# Patient Record
Sex: FEMALE | Race: WHITE | NOT HISPANIC OR LATINO | Employment: OTHER | URBAN - METROPOLITAN AREA
[De-identification: names, ages, dates, MRNs, and addresses within clinical notes are randomized per-mention and may not be internally consistent; named-entity substitution may affect disease eponyms.]

---

## 2023-05-22 LAB
ALBUMIN (G/DL) IN SER/PLAS: 4.1 G/DL (ref 3.4–5)
ALBUMIN (MG/L) IN URINE: <7 MG/L
ALBUMIN/CREATININE (UG/MG) IN URINE: ABNORMAL UG/MG CRT (ref 0–30)
ANION GAP IN SER/PLAS: 12 MMOL/L (ref 10–20)
APPEARANCE, URINE: ABNORMAL
BACTERIA, URINE: ABNORMAL /HPF
BILIRUBIN, URINE: NEGATIVE
BLOOD, URINE: NEGATIVE
CALCIDIOL (25 OH VITAMIN D3) (NG/ML) IN SER/PLAS: 115 NG/ML
CALCIUM (MG/DL) IN SER/PLAS: 9.3 MG/DL (ref 8.6–10.3)
CARBON DIOXIDE, TOTAL (MMOL/L) IN SER/PLAS: 29 MMOL/L (ref 21–32)
CHLORIDE (MMOL/L) IN SER/PLAS: 99 MMOL/L (ref 98–107)
COLOR, URINE: YELLOW
CREATININE (MG/DL) IN SER/PLAS: 1.62 MG/DL (ref 0.5–1.05)
CREATININE (MG/DL) IN URINE: 19.9 MG/DL (ref 20–320)
ERYTHROCYTE DISTRIBUTION WIDTH (RATIO) BY AUTOMATED COUNT: 16.3 % (ref 11.5–14.5)
ERYTHROCYTE MEAN CORPUSCULAR HEMOGLOBIN CONCENTRATION (G/DL) BY AUTOMATED: 31.4 G/DL (ref 32–36)
ERYTHROCYTE MEAN CORPUSCULAR VOLUME (FL) BY AUTOMATED COUNT: 88 FL (ref 80–100)
ERYTHROCYTES (10*6/UL) IN BLOOD BY AUTOMATED COUNT: 4.78 X10E12/L (ref 4–5.2)
FERRITIN (UG/LL) IN SER/PLAS: 34 UG/L (ref 8–150)
GFR FEMALE: 32 ML/MIN/1.73M2
GLUCOSE (MG/DL) IN SER/PLAS: 122 MG/DL (ref 74–99)
GLUCOSE, URINE: ABNORMAL MG/DL
HEMATOCRIT (%) IN BLOOD BY AUTOMATED COUNT: 42.1 % (ref 36–46)
HEMOGLOBIN (G/DL) IN BLOOD: 13.2 G/DL (ref 12–16)
IRON (UG/DL) IN SER/PLAS: 74 UG/DL (ref 35–150)
IRON BINDING CAPACITY (UG/DL) IN SER/PLAS: 470 UG/DL (ref 240–445)
IRON SATURATION (%) IN SER/PLAS: 16 % (ref 25–45)
KETONES, URINE: NEGATIVE MG/DL
LEUKOCYTE ESTERASE, URINE: ABNORMAL
LEUKOCYTES (10*3/UL) IN BLOOD BY AUTOMATED COUNT: 7.2 X10E9/L (ref 4.4–11.3)
MUCUS, URINE: ABNORMAL /LPF
NITRITE, URINE: NEGATIVE
PARATHYRIN INTACT (PG/ML) IN SER/PLAS: 14.3 PG/ML (ref 18.5–88)
PH, URINE: 6 (ref 5–8)
PHOSPHATE (MG/DL) IN SER/PLAS: 4.2 MG/DL (ref 2.5–4.9)
PLATELETS (10*3/UL) IN BLOOD AUTOMATED COUNT: 287 X10E9/L (ref 150–450)
POTASSIUM (MMOL/L) IN SER/PLAS: 4.2 MMOL/L (ref 3.5–5.3)
PROTEIN, URINE: NEGATIVE MG/DL
RBC, URINE: 2 /HPF (ref 0–5)
SODIUM (MMOL/L) IN SER/PLAS: 136 MMOL/L (ref 136–145)
SPECIFIC GRAVITY, URINE: 1.01 (ref 1–1.03)
SQUAMOUS EPITHELIAL CELLS, URINE: 3 /HPF
UREA NITROGEN (MG/DL) IN SER/PLAS: 39 MG/DL (ref 6–23)
UROBILINOGEN, URINE: <2 MG/DL (ref 0–1.9)
WBC, URINE: 20 /HPF (ref 0–5)

## 2023-06-13 ENCOUNTER — HOSPITAL ENCOUNTER (OUTPATIENT)
Dept: DATA CONVERSION | Facility: HOSPITAL | Age: 78
End: 2023-06-13
Attending: INTERNAL MEDICINE
Payer: MEDICARE

## 2023-06-13 DIAGNOSIS — Z86.73 PERSONAL HISTORY OF TRANSIENT ISCHEMIC ATTACK (TIA), AND CEREBRAL INFARCTION WITHOUT RESIDUAL DEFICITS: ICD-10-CM

## 2023-06-13 DIAGNOSIS — E11.22 TYPE 2 DIABETES MELLITUS WITH DIABETIC CHRONIC KIDNEY DISEASE (MULTI): ICD-10-CM

## 2023-06-13 DIAGNOSIS — Z79.82 LONG TERM (CURRENT) USE OF ASPIRIN: ICD-10-CM

## 2023-06-13 DIAGNOSIS — I48.20 CHRONIC ATRIAL FIBRILLATION, UNSPECIFIED (MULTI): ICD-10-CM

## 2023-06-13 DIAGNOSIS — L65.9 NONSCARRING HAIR LOSS, UNSPECIFIED: ICD-10-CM

## 2023-06-13 DIAGNOSIS — D12.2 BENIGN NEOPLASM OF ASCENDING COLON: ICD-10-CM

## 2023-06-13 DIAGNOSIS — R19.5 OTHER FECAL ABNORMALITIES: ICD-10-CM

## 2023-06-13 DIAGNOSIS — R91.8 OTHER NONSPECIFIC ABNORMAL FINDING OF LUNG FIELD: ICD-10-CM

## 2023-06-13 DIAGNOSIS — Z85.3 PERSONAL HISTORY OF MALIGNANT NEOPLASM OF BREAST: ICD-10-CM

## 2023-06-13 DIAGNOSIS — K57.30 DIVERTICULOSIS OF LARGE INTESTINE WITHOUT PERFORATION OR ABSCESS WITHOUT BLEEDING: ICD-10-CM

## 2023-06-13 DIAGNOSIS — I35.0 NONRHEUMATIC AORTIC (VALVE) STENOSIS: ICD-10-CM

## 2023-06-13 DIAGNOSIS — Z12.11 ENCOUNTER FOR SCREENING FOR MALIGNANT NEOPLASM OF COLON: ICD-10-CM

## 2023-06-13 DIAGNOSIS — G47.33 OBSTRUCTIVE SLEEP APNEA (ADULT) (PEDIATRIC): ICD-10-CM

## 2023-06-13 DIAGNOSIS — E66.9 OBESITY, UNSPECIFIED: ICD-10-CM

## 2023-06-13 DIAGNOSIS — F41.0 PANIC DISORDER (EPISODIC PAROXYSMAL ANXIETY): ICD-10-CM

## 2023-06-13 DIAGNOSIS — Z90.11 ACQUIRED ABSENCE OF RIGHT BREAST AND NIPPLE: ICD-10-CM

## 2023-06-13 DIAGNOSIS — I12.9 HYPERTENSIVE CHRONIC KIDNEY DISEASE WITH STAGE 1 THROUGH STAGE 4 CHRONIC KIDNEY DISEASE, OR UNSPECIFIED CHRONIC KIDNEY DISEASE: ICD-10-CM

## 2023-06-13 DIAGNOSIS — I49.9 CARDIAC ARRHYTHMIA, UNSPECIFIED: ICD-10-CM

## 2023-06-13 DIAGNOSIS — N18.9 CHRONIC KIDNEY DISEASE, UNSPECIFIED: ICD-10-CM

## 2023-06-13 DIAGNOSIS — Z79.84 LONG TERM (CURRENT) USE OF ORAL HYPOGLYCEMIC DRUGS: ICD-10-CM

## 2023-06-13 LAB
POCT GLUCOSE: 146 MG/DL (ref 74–99)
POCT GLUCOSE: 154 MG/DL (ref 74–99)

## 2023-06-20 LAB
COMPLETE PATHOLOGY REPORT: NORMAL
CONVERTED CLINICAL DIAGNOSIS-HISTORY: NORMAL
CONVERTED FINAL DIAGNOSIS: NORMAL
CONVERTED FINAL REPORT PDF LINK TO COPY AND PASTE: NORMAL
CONVERTED GROSS DESCRIPTION: NORMAL

## 2023-07-17 LAB
ANION GAP IN SER/PLAS: 13 MMOL/L (ref 10–20)
CALCIUM (MG/DL) IN SER/PLAS: 9.5 MG/DL (ref 8.6–10.6)
CARBON DIOXIDE, TOTAL (MMOL/L) IN SER/PLAS: 29 MMOL/L (ref 21–32)
CHLORIDE (MMOL/L) IN SER/PLAS: 103 MMOL/L (ref 98–107)
CREATININE (MG/DL) IN SER/PLAS: 1.68 MG/DL (ref 0.5–1.05)
ERYTHROCYTE DISTRIBUTION WIDTH (RATIO) BY AUTOMATED COUNT: 14.1 % (ref 11.5–14.5)
ERYTHROCYTE MEAN CORPUSCULAR HEMOGLOBIN CONCENTRATION (G/DL) BY AUTOMATED: 31.9 G/DL (ref 32–36)
ERYTHROCYTE MEAN CORPUSCULAR VOLUME (FL) BY AUTOMATED COUNT: 92 FL (ref 80–100)
ERYTHROCYTES (10*6/UL) IN BLOOD BY AUTOMATED COUNT: 4.49 X10E12/L (ref 4–5.2)
GFR FEMALE: 31 ML/MIN/1.73M2
GLUCOSE (MG/DL) IN SER/PLAS: 176 MG/DL (ref 74–99)
HEMATOCRIT (%) IN BLOOD BY AUTOMATED COUNT: 41.1 % (ref 36–46)
HEMOGLOBIN (G/DL) IN BLOOD: 13.1 G/DL (ref 12–16)
INR IN PPP BY COAGULATION ASSAY: NORMAL
LEUKOCYTES (10*3/UL) IN BLOOD BY AUTOMATED COUNT: 6.4 X10E9/L (ref 4.4–11.3)
NRBC (PER 100 WBCS) BY AUTOMATED COUNT: 0 /100 WBC (ref 0–0)
PLATELETS (10*3/UL) IN BLOOD AUTOMATED COUNT: 286 X10E9/L (ref 150–450)
POTASSIUM (MMOL/L) IN SER/PLAS: 4.8 MMOL/L (ref 3.5–5.3)
PROTHROMBIN TIME (PT) IN PPP BY COAGULATION ASSAY: NORMAL
SODIUM (MMOL/L) IN SER/PLAS: 140 MMOL/L (ref 136–145)
UREA NITROGEN (MG/DL) IN SER/PLAS: 35 MG/DL (ref 6–23)

## 2023-07-18 ENCOUNTER — HOSPITAL ENCOUNTER (OUTPATIENT)
Dept: DATA CONVERSION | Facility: HOSPITAL | Age: 78
End: 2023-07-18
Attending: INTERNAL MEDICINE | Admitting: INTERNAL MEDICINE
Payer: MEDICARE

## 2023-07-18 DIAGNOSIS — I10 ESSENTIAL (PRIMARY) HYPERTENSION: ICD-10-CM

## 2023-07-18 DIAGNOSIS — I48.91 UNSPECIFIED ATRIAL FIBRILLATION (MULTI): ICD-10-CM

## 2023-07-18 DIAGNOSIS — I35.0 NONRHEUMATIC AORTIC (VALVE) STENOSIS: ICD-10-CM

## 2023-07-18 DIAGNOSIS — E66.9 OBESITY, UNSPECIFIED: ICD-10-CM

## 2023-07-18 DIAGNOSIS — R07.9 CHEST PAIN, UNSPECIFIED: ICD-10-CM

## 2023-07-18 LAB
ANION GAP IN SER/PLAS: NORMAL
CALCIUM (MG/DL) IN SER/PLAS: NORMAL
CARBON DIOXIDE, TOTAL (MMOL/L) IN SER/PLAS: NORMAL
CHLORIDE (MMOL/L) IN SER/PLAS: NORMAL
CREATININE (MG/DL) IN SER/PLAS: NORMAL
ERYTHROCYTE DISTRIBUTION WIDTH (RATIO) BY AUTOMATED COUNT: NORMAL
ERYTHROCYTE MEAN CORPUSCULAR HEMOGLOBIN CONCENTRATION (G/DL) BY AUTOMATED: NORMAL
ERYTHROCYTE MEAN CORPUSCULAR VOLUME (FL) BY AUTOMATED COUNT: NORMAL
ERYTHROCYTES (10*6/UL) IN BLOOD BY AUTOMATED COUNT: NORMAL
GFR FEMALE: NORMAL
GFR MALE: NORMAL
GLUCOSE (MG/DL) IN SER/PLAS: NORMAL
HEMATOCRIT (%) IN BLOOD BY AUTOMATED COUNT: NORMAL
HEMOGLOBIN (G/DL) IN BLOOD: NORMAL
LEUKOCYTES (10*3/UL) IN BLOOD BY AUTOMATED COUNT: NORMAL
NRBC (PER 100 WBCS) BY AUTOMATED COUNT: NORMAL
PLATELETS (10*3/UL) IN BLOOD AUTOMATED COUNT: NORMAL
POTASSIUM (MMOL/L) IN SER/PLAS: NORMAL
SODIUM (MMOL/L) IN SER/PLAS: NORMAL
UREA NITROGEN (MG/DL) IN SER/PLAS: NORMAL

## 2023-08-03 LAB
ALBUMIN (G/DL) IN SER/PLAS: 4.1 G/DL (ref 3.4–5)
ANION GAP IN SER/PLAS: 16 MMOL/L (ref 10–20)
CALCIUM (MG/DL) IN SER/PLAS: 9.3 MG/DL (ref 8.6–10.6)
CARBON DIOXIDE, TOTAL (MMOL/L) IN SER/PLAS: 25 MMOL/L (ref 21–32)
CHLORIDE (MMOL/L) IN SER/PLAS: 101 MMOL/L (ref 98–107)
CREATININE (MG/DL) IN SER/PLAS: 1.75 MG/DL (ref 0.5–1.05)
GFR FEMALE: 29 ML/MIN/1.73M2
GLUCOSE (MG/DL) IN SER/PLAS: 247 MG/DL (ref 74–99)
PHOSPHATE (MG/DL) IN SER/PLAS: 4.5 MG/DL (ref 2.5–4.9)
POTASSIUM (MMOL/L) IN SER/PLAS: 4.9 MMOL/L (ref 3.5–5.3)
SODIUM (MMOL/L) IN SER/PLAS: 137 MMOL/L (ref 136–145)
UREA NITROGEN (MG/DL) IN SER/PLAS: 37 MG/DL (ref 6–23)

## 2023-08-15 LAB
ANION GAP IN SER/PLAS: 15 MMOL/L (ref 10–20)
CALCIUM (MG/DL) IN SER/PLAS: 9.1 MG/DL (ref 8.6–10.6)
CARBON DIOXIDE, TOTAL (MMOL/L) IN SER/PLAS: 26 MMOL/L (ref 21–32)
CHLORIDE (MMOL/L) IN SER/PLAS: 101 MMOL/L (ref 98–107)
CREATININE (MG/DL) IN SER/PLAS: 1.77 MG/DL (ref 0.5–1.05)
ERYTHROCYTE DISTRIBUTION WIDTH (RATIO) BY AUTOMATED COUNT: 14.2 % (ref 11.5–14.5)
ERYTHROCYTE MEAN CORPUSCULAR HEMOGLOBIN CONCENTRATION (G/DL) BY AUTOMATED: 32.1 G/DL (ref 32–36)
ERYTHROCYTE MEAN CORPUSCULAR VOLUME (FL) BY AUTOMATED COUNT: 90 FL (ref 80–100)
ERYTHROCYTES (10*6/UL) IN BLOOD BY AUTOMATED COUNT: 4.21 X10E12/L (ref 4–5.2)
GFR FEMALE: 29 ML/MIN/1.73M2
GLUCOSE (MG/DL) IN SER/PLAS: 204 MG/DL (ref 74–99)
HEMATOCRIT (%) IN BLOOD BY AUTOMATED COUNT: 38 % (ref 36–46)
HEMOGLOBIN (G/DL) IN BLOOD: 12.2 G/DL (ref 12–16)
INR IN PPP BY COAGULATION ASSAY: 1.5 (ref 0.9–1.1)
LEUKOCYTES (10*3/UL) IN BLOOD BY AUTOMATED COUNT: 6.5 X10E9/L (ref 4.4–11.3)
NRBC (PER 100 WBCS) BY AUTOMATED COUNT: 0 /100 WBC (ref 0–0)
PLATELETS (10*3/UL) IN BLOOD AUTOMATED COUNT: 319 X10E9/L (ref 150–450)
POTASSIUM (MMOL/L) IN SER/PLAS: 5.4 MMOL/L (ref 3.5–5.3)
PROTHROMBIN TIME (PT) IN PPP BY COAGULATION ASSAY: 16.4 SEC (ref 9.8–12.8)
SODIUM (MMOL/L) IN SER/PLAS: 137 MMOL/L (ref 136–145)
UREA NITROGEN (MG/DL) IN SER/PLAS: 32 MG/DL (ref 6–23)

## 2023-09-13 LAB
ALBUMIN (G/DL) IN SER/PLAS: 3.9 G/DL (ref 3.4–5)
ALBUMIN (MG/L) IN URINE: <7 MG/L
ALBUMIN/CREATININE (UG/MG) IN URINE: NORMAL UG/MG CRT (ref 0–30)
ANION GAP IN SER/PLAS: 15 MMOL/L (ref 10–20)
APPEARANCE, URINE: ABNORMAL
BACTERIA, URINE: ABNORMAL /HPF
BILIRUBIN, URINE: NEGATIVE
BLOOD, URINE: ABNORMAL
CALCIDIOL (25 OH VITAMIN D3) (NG/ML) IN SER/PLAS: 60 NG/ML
CALCIUM (MG/DL) IN SER/PLAS: 9.2 MG/DL (ref 8.6–10.6)
CARBON DIOXIDE, TOTAL (MMOL/L) IN SER/PLAS: 25 MMOL/L (ref 21–32)
CHLORIDE (MMOL/L) IN SER/PLAS: 101 MMOL/L (ref 98–107)
COLOR, URINE: YELLOW
CREATININE (MG/DL) IN SER/PLAS: 1.69 MG/DL (ref 0.5–1.05)
CREATININE (MG/DL) IN URINE: 27.4 MG/DL (ref 20–320)
ERYTHROCYTE DISTRIBUTION WIDTH (RATIO) BY AUTOMATED COUNT: 13.8 % (ref 11.5–14.5)
ERYTHROCYTE MEAN CORPUSCULAR HEMOGLOBIN CONCENTRATION (G/DL) BY AUTOMATED: 31.7 G/DL (ref 32–36)
ERYTHROCYTE MEAN CORPUSCULAR VOLUME (FL) BY AUTOMATED COUNT: 91 FL (ref 80–100)
ERYTHROCYTES (10*6/UL) IN BLOOD BY AUTOMATED COUNT: 3.9 X10E12/L (ref 4–5.2)
GFR FEMALE: 31 ML/MIN/1.73M2
GLUCOSE (MG/DL) IN SER/PLAS: 280 MG/DL (ref 74–99)
GLUCOSE, URINE: ABNORMAL MG/DL
HEMATOCRIT (%) IN BLOOD BY AUTOMATED COUNT: 35.6 % (ref 36–46)
HEMOGLOBIN (G/DL) IN BLOOD: 11.3 G/DL (ref 12–16)
KETONES, URINE: NEGATIVE MG/DL
LEUKOCYTE ESTERASE, URINE: ABNORMAL
LEUKOCYTES (10*3/UL) IN BLOOD BY AUTOMATED COUNT: 6.1 X10E9/L (ref 4.4–11.3)
NITRITE, URINE: NEGATIVE
NRBC (PER 100 WBCS) BY AUTOMATED COUNT: 0 /100 WBC (ref 0–0)
PH, URINE: 6 (ref 5–8)
PHOSPHATE (MG/DL) IN SER/PLAS: 4 MG/DL (ref 2.5–4.9)
PLATELETS (10*3/UL) IN BLOOD AUTOMATED COUNT: 319 X10E9/L (ref 150–450)
POTASSIUM (MMOL/L) IN SER/PLAS: 4.8 MMOL/L (ref 3.5–5.3)
PROTEIN, URINE: NEGATIVE MG/DL
RBC, URINE: 1 /HPF (ref 0–5)
SODIUM (MMOL/L) IN SER/PLAS: 136 MMOL/L (ref 136–145)
SPECIFIC GRAVITY, URINE: 1.02 (ref 1–1.03)
SQUAMOUS EPITHELIAL CELLS, URINE: 6 /HPF
URATE (MG/DL) IN SER/PLAS: 3.8 MG/DL (ref 2.3–6.7)
UREA NITROGEN (MG/DL) IN SER/PLAS: 32 MG/DL (ref 6–23)
UROBILINOGEN, URINE: <2 MG/DL (ref 0–1.9)
WBC, URINE: 10 /HPF (ref 0–5)

## 2023-09-14 PROBLEM — F41.8 DEPRESSION WITH ANXIETY: Status: ACTIVE | Noted: 2023-09-14

## 2023-09-14 PROBLEM — M17.10 ARTHRITIS OF KNEE: Status: ACTIVE | Noted: 2023-09-14

## 2023-09-14 PROBLEM — R01.1 HEART MURMUR: Status: ACTIVE | Noted: 2023-09-14

## 2023-09-14 PROBLEM — R89.9 ABNORMAL LABORATORY TEST RESULT: Status: ACTIVE | Noted: 2023-09-14

## 2023-09-14 PROBLEM — I48.91 ATRIAL FIBRILLATION (MULTI): Status: ACTIVE | Noted: 2023-09-14

## 2023-09-14 PROBLEM — E78.5 HYPERLIPIDEMIA: Status: ACTIVE | Noted: 2023-09-14

## 2023-09-14 PROBLEM — M65.969 SYNOVITIS OF KNEE: Status: ACTIVE | Noted: 2023-09-14

## 2023-09-14 PROBLEM — I10 HYPERTENSION: Status: ACTIVE | Noted: 2023-09-14

## 2023-09-14 PROBLEM — N28.9 NEPHROPATHY: Status: ACTIVE | Noted: 2023-09-14

## 2023-09-14 PROBLEM — D68.9 COAGULATION DEFECT, UNSPECIFIED (MULTI): Status: ACTIVE | Noted: 2023-09-14

## 2023-09-14 PROBLEM — E11.59 TYPE 2 DIABETES MELLITUS WITH CIRCULATORY DISORDER (MULTI): Status: ACTIVE | Noted: 2023-09-14

## 2023-09-14 PROBLEM — M65.9 SYNOVITIS OF KNEE: Status: ACTIVE | Noted: 2023-09-14

## 2023-09-14 PROBLEM — G47.30 SLEEP APNEA: Status: ACTIVE | Noted: 2023-09-14

## 2023-09-14 PROBLEM — I10 BENIGN ESSENTIAL HYPERTENSION: Status: ACTIVE | Noted: 2023-09-14

## 2023-09-14 PROBLEM — E11.9 TYPE 2 DIABETES MELLITUS WITHOUT COMPLICATIONS (MULTI): Status: ACTIVE | Noted: 2023-09-14

## 2023-09-14 PROBLEM — Z86.73 HISTORY OF RECURRENT TIAS: Status: ACTIVE | Noted: 2023-09-14

## 2023-09-14 PROBLEM — E55.9 VITAMIN D DEFICIENCY: Status: ACTIVE | Noted: 2023-09-14

## 2023-09-14 PROBLEM — M10.9 GOUT: Status: ACTIVE | Noted: 2023-09-14

## 2023-09-14 PROBLEM — K25.0 ACUTE GASTRIC ULCER WITH HEMORRHAGE: Status: ACTIVE | Noted: 2023-09-14

## 2023-09-14 PROBLEM — K21.9 GERD (GASTROESOPHAGEAL REFLUX DISEASE): Status: ACTIVE | Noted: 2023-09-14

## 2023-09-14 PROBLEM — D64.9 ANEMIA: Status: ACTIVE | Noted: 2023-09-14

## 2023-09-14 RX ORDER — SERTRALINE HYDROCHLORIDE 50 MG/1
25 TABLET, FILM COATED ORAL DAILY
COMMUNITY
Start: 2023-02-05

## 2023-09-14 RX ORDER — POLYETHYLENE GLYCOL 3350, SODIUM CHLORIDE, SODIUM BICARBONATE, POTASSIUM CHLORIDE 420; 11.2; 5.72; 1.48 G/4L; G/4L; G/4L; G/4L
POWDER, FOR SOLUTION ORAL
COMMUNITY
Start: 2023-04-25 | End: 2023-12-19 | Stop reason: ALTCHOICE

## 2023-09-14 RX ORDER — GLIMEPIRIDE 4 MG/1
1 TABLET ORAL
COMMUNITY
Start: 2023-02-04 | End: 2023-11-20 | Stop reason: SDUPTHER

## 2023-09-14 RX ORDER — METOPROLOL TARTRATE 50 MG/1
1 TABLET ORAL 2 TIMES DAILY
COMMUNITY

## 2023-09-14 RX ORDER — FUROSEMIDE 20 MG/1
10 TABLET ORAL DAILY
COMMUNITY
Start: 2023-05-24

## 2023-09-14 RX ORDER — NAPROXEN SODIUM 220 MG/1
1 TABLET, FILM COATED ORAL DAILY
COMMUNITY
Start: 2022-02-11

## 2023-09-14 RX ORDER — EZETIMIBE 10 MG/1
1 TABLET ORAL NIGHTLY
COMMUNITY
Start: 2013-09-30

## 2023-09-14 RX ORDER — FENOFIBRATE 145 MG/1
1 TABLET, FILM COATED ORAL DAILY
COMMUNITY
Start: 2015-09-11 | End: 2023-12-19 | Stop reason: ALTCHOICE

## 2023-09-14 RX ORDER — LIDOCAINE 50 MG/G
PATCH TOPICAL
COMMUNITY
Start: 2022-12-01 | End: 2023-12-19 | Stop reason: ALTCHOICE

## 2023-09-14 RX ORDER — METFORMIN HYDROCHLORIDE 500 MG/1
1 TABLET ORAL 2 TIMES DAILY
COMMUNITY
End: 2024-04-15

## 2023-09-14 RX ORDER — COLESEVELAM 180 1/1
3 TABLET ORAL
COMMUNITY

## 2023-09-14 RX ORDER — DILTIAZEM HYDROCHLORIDE 180 MG/1
180 CAPSULE, COATED, EXTENDED RELEASE ORAL DAILY
COMMUNITY
Start: 2023-05-27 | End: 2023-11-20

## 2023-09-14 RX ORDER — GLIMEPIRIDE 2 MG/1
TABLET ORAL
COMMUNITY
Start: 2017-09-15 | End: 2023-11-20 | Stop reason: SDUPTHER

## 2023-09-14 RX ORDER — ERGOCALCIFEROL 1.25 MG/1
1 CAPSULE ORAL 2 TIMES WEEKLY
COMMUNITY
Start: 2013-11-17

## 2023-09-14 RX ORDER — PANTOPRAZOLE SODIUM 40 MG/1
1 TABLET, DELAYED RELEASE ORAL 2 TIMES DAILY
COMMUNITY

## 2023-09-14 RX ORDER — DICLOFENAC SODIUM 10 MG/G
GEL TOPICAL 3 TIMES DAILY
COMMUNITY
Start: 2022-12-01

## 2023-09-14 RX ORDER — ALPRAZOLAM 0.25 MG/1
TABLET ORAL
COMMUNITY
End: 2024-05-31 | Stop reason: SDUPTHER

## 2023-09-14 RX ORDER — FENOFIBRATE 160 MG/1
1 TABLET ORAL DAILY
COMMUNITY
End: 2023-11-20

## 2023-09-14 RX ORDER — WARFARIN 2.5 MG/1
2.5 TABLET ORAL 3 TIMES WEEKLY
COMMUNITY
Start: 2022-02-15

## 2023-09-14 RX ORDER — FERROUS SULFATE 325(65) MG
1 TABLET, DELAYED RELEASE (ENTERIC COATED) ORAL 2 TIMES DAILY
COMMUNITY
Start: 2022-03-15 | End: 2023-11-20 | Stop reason: ALTCHOICE

## 2023-09-14 RX ORDER — ASCORBIC ACID 500 MG
1 TABLET ORAL 2 TIMES DAILY
COMMUNITY

## 2023-09-14 RX ORDER — LANCETS
EACH MISCELLANEOUS DAILY
COMMUNITY

## 2023-09-14 RX ORDER — WARFARIN SODIUM 5 MG/1
TABLET ORAL
COMMUNITY
Start: 2022-02-15 | End: 2024-01-26 | Stop reason: SDUPTHER

## 2023-09-14 RX ORDER — ALLOPURINOL 300 MG/1
1 TABLET ORAL DAILY
COMMUNITY

## 2023-09-14 RX ORDER — BLOOD-GLUCOSE METER
EACH MISCELLANEOUS DAILY
COMMUNITY
Start: 2023-07-07

## 2023-09-14 RX ORDER — MELATON/THEAN/VAL/LEM/CHAM/LAV 10MG-200MG
1 TABLET,IMMED, EXTENDED RELEASE, BIPHASIC ORAL DAILY
COMMUNITY
End: 2023-12-19 | Stop reason: ALTCHOICE

## 2023-09-14 RX ORDER — SERTRALINE HYDROCHLORIDE 25 MG/1
0.5 TABLET, FILM COATED ORAL DAILY
COMMUNITY
End: 2023-11-20 | Stop reason: SDUPTHER

## 2023-09-14 RX ORDER — GLIMEPIRIDE 1 MG/1
2 TABLET ORAL
COMMUNITY
Start: 2023-05-27 | End: 2024-02-02 | Stop reason: SDUPTHER

## 2023-09-21 DIAGNOSIS — I48.91 ATRIAL FIBRILLATION, UNSPECIFIED TYPE (MULTI): Primary | ICD-10-CM

## 2023-09-21 LAB
INR IN PPP BY COAGULATION ASSAY EXTERNAL: 2.9
PROTHROMBIN TIME (PT) IN PPP BY COAGULATION ASSAY EXTERNAL: NORMAL SECONDS

## 2023-09-29 VITALS — HEIGHT: 65 IN | BODY MASS INDEX: 36.22 KG/M2 | WEIGHT: 217.37 LBS

## 2023-09-30 NOTE — H&P
History of Present Illness:   History Present Illness:  Reason for surgery: right and left heart cath   HPI:    77 year old female who presents for right and left heart cath with Dr. Mandujano    Allergies:        Allergies:  ·  No Known Allergies :     Home Medication Review:   Home Medications Reviewed: yes     Impression/Procedure:   ·  Impression and Planned Procedure: right and left heart cath       ERAS (Enhanced Recovery After Surgery):  ·  ERAS Patient: no     Review of Systems:   Review of Systems:  Constitutional: NEGATIVE: Fever, Chills, Anorexia,  Weight Loss, Malaise     Eyes: NEGATIVE: Blurry Vision, Drainage, Diploplia,  Redness, Vision Loss/ Change     ENMT: NEGATIVE: Nasal Discharge, Nasal Congestion,  Ear Pain, Mouth Pain, Throat Pain     Respiratory: NEGATIVE: Dry Cough, Productive Cough,  Hemoptysis, Wheezing, Shortness of Breath     Cardiac: NEGATIVE: Chest Pain, Dyspnea on Exertion,  Orthopnea, Palpitations, Syncope     Gastrointestinal: NEGATIVE: Nausea, Vomiting, Diarrhea,  Constipation, Abdominal Pain     Genitourinary: NEGATIVE: Discharge, Dysuria, Flank  Pain, Frequency, Hematuria     Musculoskeletal: NEGATIVE: Decreased ROM, Pain,  Swelling, Stiffness, Weakness     Neurological: NEGATIVE: Dizziness, Confusion, Headache,  Seizures, Syncope     Psychiatric: NEGATIVE: Mood Changes, Anxiety, Hallucinations,  Sleep Changes, Suicidal Ideas     Skin: NEGATIVE: Mass, Pain, Pruritus, Rash, Ulcer     Endocrine: NEGATIVE: Heat Intolerance, Cold Intolerance,  Sweat, Polyuria, Thirst     Hematologic/Lymph: NEGATIVE: Anemia, Bruising,  Easy Bleeding, Night Sweats, Petechiae     Allergic/Immunologic: NEGATIVE: Anaphylaxis, Itchy/  Teary Eyes, Itching, Sneezing, Swelling         Physical Exam by System:    Constitutional: awake, no distress, alert and cooperative   Eyes: EOMI, clear sclera   ENMT: mucous membranes moist, Mallampati score _II__   Head/Neck: Neck supple   Respiratory/Thorax: Patent  airways, CTAB, normal  breath sounds with good chest expansion, thorax symmetric   Cardiovascular: Regular, rate and rhythm, + systolic  murmur, 1+ equal pulses of the extremities   Gastrointestinal: Nondistended, soft, non-tender,  no rebound tenderness or guarding, +BS   Musculoskeletal: ROM intact   Extremities: no edema   Neurological: alert, answers questions appropriately   Psychological: Appropriate mood and behavior, frustrated  with staff   Skin: Warm and dry     Consent:   COVID-19 Consent:  ·  COVID-19 Risk Consent Surgeon has reviewed key risks related to the risk of adrián COVID-19 and if they contract COVID-19 what the risks are.       Electronic Signatures:  Edmund Gonzalez (NINO, APRN-CNP)  (Signed 18-Jul-2023 10:09)   Authored: History of Present Illness, Allergies, Home  Medication Review, Impression/Procedure, ERAS, Review of Systems, Physical Exam, Consent, Note Completion      Last Updated: 18-Jul-2023 10:09 by Edmund Gonzalez (DNP, APRN-CNP)

## 2023-10-02 ENCOUNTER — ANTICOAGULATION - WARFARIN VISIT (OUTPATIENT)
Dept: CARDIOLOGY | Facility: CLINIC | Age: 78
End: 2023-10-02
Payer: MEDICARE

## 2023-10-05 ENCOUNTER — ANTICOAGULATION - WARFARIN VISIT (OUTPATIENT)
Dept: CARDIOLOGY | Facility: CLINIC | Age: 78
End: 2023-10-05
Payer: MEDICARE

## 2023-10-05 DIAGNOSIS — I48.91 ATRIAL FIBRILLATION, UNSPECIFIED TYPE (MULTI): ICD-10-CM

## 2023-10-05 LAB
POC INR: 2.5
POC PROTHROMBIN TIME: NORMAL

## 2023-10-05 PROCEDURE — 99211 OFF/OP EST MAY X REQ PHY/QHP: CPT | Mod: 27 | Performed by: INTERNAL MEDICINE

## 2023-10-05 PROCEDURE — 85610 PROTHROMBIN TIME: CPT | Mod: QW

## 2023-10-05 NOTE — PROGRESS NOTES
Patient identification verified with 2 identifiers.    Location: [facility] - [address] [place], [place] [address] [phone]     Referring Physician: Delbert  Enrollment/ Re-enrollment date: 2024   INR Goal: 2.0-3.0  INR monitoring is per [facility] protocol.  Anticoagulation Medication: Jantoven  Indication: atrial fibrillation    Subjective   Bleeding signs/symptoms: No    Bruising: No   Major bleeding event: No  Thrombosis signs/symptoms: No  Thromboembolic event: No  Missed doses: No  Extra doses: No  Medication changes: Yes  pt took 2.5mg of coumadin on Oct 4th instead of 5mg. Dosing schedule was changed to maintain normal weekl dosewas alarcon  Dietary changes: No  Change in health: No  Change in activity: No  Alcohol: No  Other concerns: No    Upcoming Surgeries:  Does the Patient Have any upcoming surgeries that require interruption in anticoagulation therapy? no  Does the patient require bridging? no      Anticoagulation Summary  As of 10/5/2023      INR goal:  2.0-3.0   TTR:  100.0 % (4 d)   INR used for dosin.50 (10/5/2023)   Weekly warfarin total:  22.5 mg               Assessment/Plan   Therapeutic     1. New dose: no change    2. Next INR: 1 month      Education provided to patient during the visit:  Patient instructed to call in interim with questions, concerns and changes.   Patient educated on interactions between medications and warfarin.   Patient educated on dietary consistency in vitamin k consumption.   Patient educated on affects of alcohol consumption while taking warfarin.   Patient educated on signs of bleeding/clotting.   Patient educated on compliance with dosing, follow up appointments, and prescribed plan of care.

## 2023-10-06 ENCOUNTER — OFFICE VISIT (OUTPATIENT)
Dept: ORTHOPEDIC SURGERY | Facility: CLINIC | Age: 78
End: 2023-10-06
Payer: MEDICARE

## 2023-10-06 DIAGNOSIS — M17.11 PRIMARY OSTEOARTHRITIS OF RIGHT KNEE: Primary | ICD-10-CM

## 2023-10-06 DIAGNOSIS — M19.90 INFLAMMATORY ARTHROPATHY: ICD-10-CM

## 2023-10-06 PROCEDURE — 1126F AMNT PAIN NOTED NONE PRSNT: CPT | Performed by: EMERGENCY MEDICINE

## 2023-10-06 PROCEDURE — 1159F MED LIST DOCD IN RCRD: CPT | Performed by: EMERGENCY MEDICINE

## 2023-10-06 PROCEDURE — 99213 OFFICE O/P EST LOW 20 MIN: CPT | Performed by: EMERGENCY MEDICINE

## 2023-10-06 PROCEDURE — 20611 DRAIN/INJ JOINT/BURSA W/US: CPT | Performed by: EMERGENCY MEDICINE

## 2023-10-06 RX ORDER — LIDOCAINE HYDROCHLORIDE 10 MG/ML
4 INJECTION INFILTRATION; PERINEURAL
Status: COMPLETED | OUTPATIENT
Start: 2023-10-06 | End: 2023-10-06

## 2023-10-06 RX ORDER — TRIAMCINOLONE ACETONIDE 40 MG/ML
80 INJECTION, SUSPENSION INTRA-ARTICULAR; INTRAMUSCULAR
Status: COMPLETED | OUTPATIENT
Start: 2023-10-06 | End: 2023-10-06

## 2023-10-06 RX ADMIN — TRIAMCINOLONE ACETONIDE 80 MG: 40 INJECTION, SUSPENSION INTRA-ARTICULAR; INTRAMUSCULAR at 09:23

## 2023-10-06 RX ADMIN — LIDOCAINE HYDROCHLORIDE 4 ML: 10 INJECTION INFILTRATION; PERINEURAL at 09:23

## 2023-10-06 NOTE — PROGRESS NOTES
Sports Medicine Office Note    Today's Date: 10/6/2023     HPI: Kyleigh Chappell is a 77 y.o. with gout who presents today for right knee pain.    10/6/23: She returns today for her chronic right knee DJD.  When she was seen on 1/13/2023, she elected against injection therapy at that time considering that her pain had improved.  However, now her knee is throbbing and aching with weightbearing activities.  She uses a cane for ambulation.  She takes Tylenol 500 mg 0-1 times a day.  She is on warfarin and avoids NSAIDs.  Denies any interval injury.      1/30/23: Patient returns today for right knee pain and MRI review. The aspiration removed from her knee on 1/13/2023 did confirm a gout flare. She has worked on dietary changes and has continued taking previous prescribed allopurinol. Overall, she feels that her pain has improved significantly since her last visit. She is also following up for MRI review for further evaluation for femoral sclerotic lesion.    1/13/23: Returns today for right knee pain. She did have a knee aspiration and injection performed on 12/2/2022. Also during that visit, we discussed the importance of proceeding with an MRI due to sclerotic lesion and history of breast CA. She is scheduled for that MRI in the near future. In the meantime, she states that her knee swelling has returned and she is requesting a repeat aspiration. She does not feel that she received adequate pain relief from the corticosteroid injection either. She would like to discuss further treatment options in regards to pain control.    12/2/22: Kyleigh is a 77-year-old female coming in with right knee pain. She states that she has had a steroid injection in the left knee years ago. Her and her  are preparing to move to a new house next weekend and while rearranging and packing she began to have right-sided knee pain. On Wednesday she went to the emergency room where she had her right knee aspirated. X-rays were also  obtained. She is on Coumadin and had recently been taking Imodium so they were concerned about a potential supratherapeutic level causing a hemarthrosis. She was then given today's appointment for a follow-up. Of note she was also told about potentially needing an MRI of her right knee because of a sclerotic lesion that was seen on her distal femur on the x-rays obtained earlier this week. She denies any known traumatic events or injury mechanisms. Her pain is moderate in the right knee and does not radiate. No other questions at this time.        Review of Systems: See pertinent ROS in HPI above     Patient's Allergies, Current Medications, Past Medical History, Past Surgical History, Family History, Social History reviewed.    Physical Examination:     General/Constitutional: No apparent distress. Well-nourished and well developed.  Head: Normocephalic, Atraumatic.   Eyes: EOMI.  Vascular: No edema, swelling or tenderness, except as noted in detailed exam.  Respiratory: Non-labored breathing.  Integumentary: No impressive skin lesions present, except as noted in detailed exam.  Neurological: Alert and oriented.  Psychological:  Normal mood and affect.  Musculoskeletal: Normal, except as noted in detailed exam.  The right knee is without obvious signs of acute bony deformity, erythema, or ecchymosis. +R knee effusion. The patella is without tenderness. Apprehension is negative with medial and lateral glide. Patella crepitus is positive. Patella grind is negative. The medial joint line is nontender and without bony crepitus or step-off. The lateral joint line is nontender and without bony crepitus or step-off. Flexion & extension are full and symmetrical. Varus & valgus stress test at 0° and 30° of flexion, Lachman's, and posterior drawer are all negative. The opposite knee is nontender and stable. Gait is antalgic and tandem.    Patient ID: Kyleigh Chappell is a 77 y.o. female.    L Inj/Asp: R knee on 10/6/2023  9:23 AM  Indications: pain and joint swelling  Details: 18 G needle, ultrasound-guided superolateral approach  Medications: 80 mg triamcinolone acetonide 40 mg/mL; 4 mL lidocaine 10 mg/mL (1 %)  Aspirate: 17 mL clear and blood-tinged  Outcome: tolerated well, no immediate complications  Procedure, treatment alternatives, risks and benefits explained, specific risks discussed. Consent was given by the patient. Immediately prior to procedure a time out was called to verify the correct patient, procedure, equipment, support staff and site/side marked as required. Patient was prepped and draped in the usual sterile fashion.           Assessment and Plan:     1. Primary osteoarthritis of right knee        2. Inflammatory arthropathy            We will proceed with a right knee corticosteroid injection today, which she tolerated well.  We will order Zilretta injections for her inflammatory arthropathy if she does not receive long-term relief from this injection today.  Follow-up after injection is approved by insurance and pain returns.    Shaheen Oliveira DO  Sports Medicine Fellow  [facility] [place]

## 2023-10-06 NOTE — LETTER
October 6, 2023     Danielle Quiroz MD  [address]  [id number]  [facility] [address]    Patient: Kyleigh Chappell   YOB: 1945   Date of Visit: 10/6/2023       Dear Dr. Danielle Quiroz MD:    Thank you for referring Kyleigh Chappell to me for evaluation. Below are my notes for this consultation.  If you have questions, please do not hesitate to call me. I look forward to following your patient along with you.       Sincerely,     Kranthi Montoya, DO      CC: No Recipients  ______________________________________________________________________________________    Sports Medicine Office Note    Today's Date: 10/6/2023     HPI: Kyleigh Chappell is a 77 y.o. with gout who presents today for right knee pain.    10/6/23: She returns today for her chronic right knee DJD.  When she was seen on 1/13/2023, she elected against injection therapy at that time considering that her pain had improved.  However, now her knee is throbbing and aching with weightbearing activities.  She uses a cane for ambulation.  She takes Tylenol 500 mg 0-1 times a day.  She is on warfarin and avoids NSAIDs.  Denies any interval injury.      1/30/23: Patient returns today for right knee pain and MRI review. The aspiration removed from her knee on 1/13/2023 did confirm a gout flare. She has worked on dietary changes and has continued taking previous prescribed allopurinol. Overall, she feels that her pain has improved significantly since her last visit. She is also following up for MRI review for further evaluation for femoral sclerotic lesion.    1/13/23: Returns today for right knee pain. She did have a knee aspiration and injection performed on 12/2/2022. Also during that visit, we discussed the importance of proceeding with an MRI due to sclerotic lesion and history of breast CA. She is scheduled for that MRI in the near future. In the meantime, she states that her knee swelling has returned and she is requesting a repeat  aspiration. She does not feel that she received adequate pain relief from the corticosteroid injection either. She would like to discuss further treatment options in regards to pain control.    12/2/22: Kyleigh is a 77-year-old female coming in with right knee pain. She states that she has had a steroid injection in the left knee years ago. Her and her  are preparing to move to a new house next weekend and while rearranging and packing she began to have right-sided knee pain. On Wednesday she went to the emergency room where she had her right knee aspirated. X-rays were also obtained. She is on Coumadin and had recently been taking Imodium so they were concerned about a potential supratherapeutic level causing a hemarthrosis. She was then given today's appointment for a follow-up. Of note she was also told about potentially needing an MRI of her right knee because of a sclerotic lesion that was seen on her distal femur on the x-rays obtained earlier this week. She denies any known traumatic events or injury mechanisms. Her pain is moderate in the right knee and does not radiate. No other questions at this time.        Review of Systems: See pertinent ROS in HPI above     Patient's Allergies, Current Medications, Past Medical History, Past Surgical History, Family History, Social History reviewed.    Physical Examination:     General/Constitutional: No apparent distress. Well-nourished and well developed.  Head: Normocephalic, Atraumatic.   Eyes: EOMI.  Vascular: No edema, swelling or tenderness, except as noted in detailed exam.  Respiratory: Non-labored breathing.  Integumentary: No impressive skin lesions present, except as noted in detailed exam.  Neurological: Alert and oriented.  Psychological:  Normal mood and affect.  Musculoskeletal: Normal, except as noted in detailed exam.  The right knee is without obvious signs of acute bony deformity, erythema, or ecchymosis. +R knee effusion. The patella is  without tenderness. Apprehension is negative with medial and lateral glide. Patella crepitus is positive. Patella grind is negative. The medial joint line is nontender and without bony crepitus or step-off. The lateral joint line is nontender and without bony crepitus or step-off. Flexion & extension are full and symmetrical. Varus & valgus stress test at 0° and 30° of flexion, Lachman's, and posterior drawer are all negative. The opposite knee is nontender and stable. Gait is antalgic and tandem.    Patient ID: Kyleigh Chappell is a 77 y.o. female.    L Inj/Asp: R knee on 10/6/2023 9:23 AM  Indications: pain and joint swelling  Details: 18 G needle, ultrasound-guided superolateral approach  Medications: 80 mg triamcinolone acetonide 40 mg/mL; 4 mL lidocaine 10 mg/mL (1 %)  Aspirate: 17 mL clear and blood-tinged  Outcome: tolerated well, no immediate complications  Procedure, treatment alternatives, risks and benefits explained, specific risks discussed. Consent was given by the patient. Immediately prior to procedure a time out was called to verify the correct patient, procedure, equipment, support staff and site/side marked as required. Patient was prepped and draped in the usual sterile fashion.           Assessment and Plan:     1. Primary osteoarthritis of right knee        2. Inflammatory arthropathy            We will proceed with a right knee corticosteroid injection today, which she tolerated well.  We will order Zilretta injections for her inflammatory arthropathy if she does not receive long-term relief from this injection today.  Follow-up after injection is approved by insurance and pain returns.    Shaheen Oliveira DO  Sports Medicine Fellow  [facility] [place]    Pt is here today for fu of the Rt knee.

## 2023-10-10 DIAGNOSIS — E11.9 TYPE 2 DIABETES MELLITUS WITHOUT COMPLICATION, WITHOUT LONG-TERM CURRENT USE OF INSULIN (MULTI): Primary | ICD-10-CM

## 2023-10-10 RX ORDER — LANCETS 33 GAUGE
EACH MISCELLANEOUS
Qty: 100 EACH | Refills: 3 | Status: SHIPPED | OUTPATIENT
Start: 2023-10-10

## 2023-10-31 ENCOUNTER — TELEPHONE (OUTPATIENT)
Dept: CARDIOLOGY | Facility: CLINIC | Age: 78
End: 2023-10-31
Payer: MEDICARE

## 2023-10-31 DIAGNOSIS — Z95.2 S/P TAVR (TRANSCATHETER AORTIC VALVE REPLACEMENT): Primary | ICD-10-CM

## 2023-10-31 RX ORDER — AMOXICILLIN 500 MG/1
2000 CAPSULE ORAL ONCE
Qty: 4 CAPSULE | Refills: 0 | Status: SHIPPED | OUTPATIENT
Start: 2023-10-31 | End: 2023-10-31

## 2023-11-02 ENCOUNTER — ANTICOAGULATION - WARFARIN VISIT (OUTPATIENT)
Dept: CARDIOLOGY | Facility: CLINIC | Age: 78
End: 2023-11-02
Payer: MEDICARE

## 2023-11-02 DIAGNOSIS — I48.91 ATRIAL FIBRILLATION, UNSPECIFIED TYPE (MULTI): Primary | ICD-10-CM

## 2023-11-02 LAB
POC INR: 2.9
POC PROTHROMBIN TIME: NORMAL

## 2023-11-02 PROCEDURE — 99211 OFF/OP EST MAY X REQ PHY/QHP: CPT | Mod: 27 | Performed by: INTERNAL MEDICINE

## 2023-11-02 PROCEDURE — 85610 PROTHROMBIN TIME: CPT | Mod: QW

## 2023-11-02 NOTE — PROGRESS NOTES
Patient identification verified with 2 identifiers.    Location: [facility] - [address] [place], [place] [address] [phone]     Referring Physician: DR. CHEN  Enrollment/ Re-enrollment date: 24   INR Goal: 2.0-3.0  INR monitoring is per [facility] protocol.  Anticoagulation Medication: warfarin  Indication: Atrial Fibrillation/Atrial Flutter    Subjective   Bleeding signs/symptoms: No    Bruising: No   Major bleeding event: No  Thrombosis signs/symptoms: No  Thromboembolic event: No  Missed doses: No  Extra doses: No  Medication changes: No  PT HAS BEEN TAKING TYLENOL EVERY OTHER DAY FOR MUSCLE PAIN.  Dietary changes: No  PT HAS BEEN EATING MORE GREEN VEGETABLES WHILE TAKING TYLENOL.  Change in health: No  Change in activity: No  Alcohol: No  Other concerns: No    Upcoming Surgeries:  Does the Patient Have any upcoming surgeries that require interruption in anticoagulation therapy? no  Does the patient require bridging? no      Anticoagulation Summary  As of 2023      INR goal:  2.0-3.0   TTR:  100.0 % (1.1 mo)   INR used for dosin.90 (2023)   Weekly warfarin total:  22.5 mg               Assessment/Plan   Therapeutic     1. New dose: no change    2. Next INR: 1 month      Education provided to patient during the visit:  Patient instructed to call in interim with questions, concerns and changes.   Patient educated on interactions between medications and warfarin.   Patient educated on dietary consistency in vitamin k consumption.   Patient educated on affects of alcohol consumption while taking warfarin.   Patient educated on signs of bleeding/clotting.   Patient educated on compliance with dosing, follow up appointments, and prescribed plan of care.

## 2023-11-18 DIAGNOSIS — I10 BENIGN ESSENTIAL HYPERTENSION: Primary | ICD-10-CM

## 2023-11-18 DIAGNOSIS — E78.2 MIXED HYPERLIPIDEMIA: ICD-10-CM

## 2023-11-20 ENCOUNTER — OFFICE VISIT (OUTPATIENT)
Dept: PRIMARY CARE | Facility: CLINIC | Age: 78
End: 2023-11-20
Payer: MEDICARE

## 2023-11-20 VITALS
SYSTOLIC BLOOD PRESSURE: 124 MMHG | HEART RATE: 68 BPM | OXYGEN SATURATION: 98 % | DIASTOLIC BLOOD PRESSURE: 68 MMHG | BODY MASS INDEX: 37.81 KG/M2 | WEIGHT: 222 LBS

## 2023-11-20 DIAGNOSIS — M1A.09X0 IDIOPATHIC CHRONIC GOUT OF MULTIPLE SITES WITHOUT TOPHUS: ICD-10-CM

## 2023-11-20 DIAGNOSIS — E55.9 VITAMIN D DEFICIENCY: ICD-10-CM

## 2023-11-20 DIAGNOSIS — Z86.73 HISTORY OF RECURRENT TIAS: ICD-10-CM

## 2023-11-20 DIAGNOSIS — E11.59 TYPE 2 DIABETES MELLITUS WITH OTHER CIRCULATORY COMPLICATION, WITHOUT LONG-TERM CURRENT USE OF INSULIN (MULTI): Primary | ICD-10-CM

## 2023-11-20 DIAGNOSIS — I48.11 LONGSTANDING PERSISTENT ATRIAL FIBRILLATION (MULTI): ICD-10-CM

## 2023-11-20 DIAGNOSIS — K21.9 GASTROESOPHAGEAL REFLUX DISEASE WITHOUT ESOPHAGITIS: ICD-10-CM

## 2023-11-20 DIAGNOSIS — I10 BENIGN ESSENTIAL HYPERTENSION: ICD-10-CM

## 2023-11-20 DIAGNOSIS — E78.2 MIXED HYPERLIPIDEMIA: ICD-10-CM

## 2023-11-20 DIAGNOSIS — G47.33 OBSTRUCTIVE SLEEP APNEA SYNDROME: ICD-10-CM

## 2023-11-20 DIAGNOSIS — N28.9 NEPHROPATHY: ICD-10-CM

## 2023-11-20 DIAGNOSIS — F41.8 DEPRESSION WITH ANXIETY: ICD-10-CM

## 2023-11-20 PROBLEM — E11.9 TYPE 2 DIABETES MELLITUS WITHOUT COMPLICATIONS (MULTI): Status: RESOLVED | Noted: 2023-09-14 | Resolved: 2023-11-20

## 2023-11-20 PROBLEM — K25.0 ACUTE GASTRIC ULCER WITH HEMORRHAGE: Status: RESOLVED | Noted: 2023-09-14 | Resolved: 2023-11-20

## 2023-11-20 PROBLEM — M65.969 SYNOVITIS OF KNEE: Status: RESOLVED | Noted: 2023-09-14 | Resolved: 2023-11-20

## 2023-11-20 PROBLEM — R01.1 HEART MURMUR: Status: RESOLVED | Noted: 2023-09-14 | Resolved: 2023-11-20

## 2023-11-20 PROBLEM — M65.9 SYNOVITIS OF KNEE: Status: RESOLVED | Noted: 2023-09-14 | Resolved: 2023-11-20

## 2023-11-20 PROBLEM — R89.9 ABNORMAL LABORATORY TEST RESULT: Status: RESOLVED | Noted: 2023-09-14 | Resolved: 2023-11-20

## 2023-11-20 LAB — POC HEMOGLOBIN A1C: 8.8 % (ref 4.2–6.5)

## 2023-11-20 PROCEDURE — 99214 OFFICE O/P EST MOD 30 MIN: CPT | Performed by: INTERNAL MEDICINE

## 2023-11-20 PROCEDURE — 1159F MED LIST DOCD IN RCRD: CPT | Performed by: INTERNAL MEDICINE

## 2023-11-20 PROCEDURE — 3078F DIAST BP <80 MM HG: CPT | Performed by: INTERNAL MEDICINE

## 2023-11-20 PROCEDURE — 3074F SYST BP LT 130 MM HG: CPT | Performed by: INTERNAL MEDICINE

## 2023-11-20 PROCEDURE — 1126F AMNT PAIN NOTED NONE PRSNT: CPT | Performed by: INTERNAL MEDICINE

## 2023-11-20 RX ORDER — FENOFIBRATE 160 MG/1
160 TABLET ORAL DAILY
Qty: 90 TABLET | Refills: 3 | Status: SHIPPED | OUTPATIENT
Start: 2023-11-20 | End: 2023-11-20 | Stop reason: ALTCHOICE

## 2023-11-20 RX ORDER — DILTIAZEM HYDROCHLORIDE 180 MG/1
180 CAPSULE, COATED, EXTENDED RELEASE ORAL DAILY
Qty: 90 CAPSULE | Refills: 3 | Status: SHIPPED | OUTPATIENT
Start: 2023-11-20

## 2023-11-20 ASSESSMENT — ENCOUNTER SYMPTOMS
LOSS OF SENSATION IN FEET: 0
DIARRHEA: 0
DYSURIA: 0
CARDIOVASCULAR NEGATIVE: 1
ACTIVITY CHANGE: 0
OCCASIONAL FEELINGS OF UNSTEADINESS: 0
PSYCHIATRIC NEGATIVE: 1
ABDOMINAL PAIN: 0
CONSTIPATION: 0
CONSTITUTIONAL NEGATIVE: 1
SHORTNESS OF BREATH: 0
DEPRESSION: 0
ARTHRALGIAS: 0
COUGH: 0

## 2023-11-20 ASSESSMENT — COLUMBIA-SUICIDE SEVERITY RATING SCALE - C-SSRS
2. HAVE YOU ACTUALLY HAD ANY THOUGHTS OF KILLING YOURSELF?: NO
6. HAVE YOU EVER DONE ANYTHING, STARTED TO DO ANYTHING, OR PREPARED TO DO ANYTHING TO END YOUR LIFE?: NO

## 2023-11-20 ASSESSMENT — PATIENT HEALTH QUESTIONNAIRE - PHQ9
1. LITTLE INTEREST OR PLEASURE IN DOING THINGS: NOT AT ALL
SUM OF ALL RESPONSES TO PHQ9 QUESTIONS 1 AND 2: 0
2. FEELING DOWN, DEPRESSED OR HOPELESS: NOT AT ALL

## 2023-11-20 ASSESSMENT — PAIN SCALES - GENERAL: PAINLEVEL: 0-NO PAIN

## 2023-11-20 NOTE — PROGRESS NOTES
Subjective   Patient ID: Kyleigh Chappell is a 78 y.o. female who presents for Follow-up.    Atrial Fibrillation--stable on meds-remains on coumadin. Managed by  Dr Mandujano    S/p aortic valve repair    NIDDM 2--stable on meds.  Sugars at home:  140-150 x/ when on steroids .  Last A1c- 8.8  11/2023    GERD-stable on  protonix    Gout--last uric acid 5.6 . On allopurinol. Last flare after lobster    Depression with anxiety---fairly stable w/ sertraline and rarely needs xanax    CRF-9/2023 GFR 34    Sleep apnea-uses cpap nightly    H/o recurrant TIA's-no symptoms since on coumadin    R arm pain after cath-thinks did too much too soon      Current Outpatient Medications:   ·  allopurinol (Zyloprim) 300 mg tablet, Take 1 tablet (300 mg) by mouth once daily., Disp: , Rfl:   ·  ALPRAZolam (Xanax) 0.25 mg tablet, TAKE 1/2 TO 1 TABLET BY MOUTH THREE TIMES A DAY if needed for 30 days, Disp: , Rfl:   ·  ascorbic acid (Vitamin C) 500 mg tablet, Take 1 tablet (500 mg) by mouth 2 times a day. With iron tablet, Disp: , Rfl:   ·  aspirin 81 mg chewable tablet, Chew 1 tablet (81 mg) once daily., Disp: , Rfl:   ·  cholecalciferol, vitamin D3, (VITAMIN D3 ORAL), Take 1 capsule by mouth 2 times a week., Disp: , Rfl:   ·  colesevelam (Welchol) 625 mg tablet, Take 3 tablets (1,875 mg) by mouth 2 times a day with meals., Disp: , Rfl:   ·  diclofenac sodium (Voltaren) 1 % gel gel, Apply topically 3 times a day., Disp: , Rfl:   ·  dilTIAZem CD (Cardizem CD) 180 mg 24 hr capsule, TAKE ONE CAPSULE BY MOUTH EVERY DAY, Disp: 90 capsule, Rfl: 3  ·  empagliflozin (Jardiance) 25 mg, Take 1 tablet (25 mg) by mouth once daily., Disp: , Rfl:   ·  ergocalciferol (Vitamin D-2) 1.25 MG (74634 UT) capsule, Take 1 capsule (1,250 mcg) by mouth 2 times a week., Disp: , Rfl:   ·  ezetimibe (Zetia) 10 mg tablet, Take 1 tablet (10 mg) by mouth once daily at bedtime., Disp: , Rfl:   ·  fenofibrate (Tricor) 145 mg tablet, Take 1 tablet (145 mg) by mouth  once daily., Disp: , Rfl:   ·  folic acid/vit B complex and C (B complex-vitamin C-folic acid) 400 mcg tablet extended release, Take 1 tablet by mouth once daily., Disp: , Rfl:   ·  furosemide (Lasix) 20 mg tablet, Take 0.5 tablets (10 mg) by mouth once daily., Disp: , Rfl:   ·  glimepiride (Amaryl) 1 mg tablet, TAKE ONE TABLET BY MOUTH WITH BREAKFAST OR THE FIRST MAIN MEAL OF THE DAY ONCE EVERY DAY, Disp: , Rfl:   ·  lancets (OneTouch Delica Plus Lancet) 33 gauge misc, use daily, Disp: 100 each, Rfl: 3  ·  lancets misc, once daily., Disp: , Rfl:   ·  metFORMIN (Glucophage) 500 mg tablet, Take 1 tablet (500 mg) by mouth twice a day., Disp: , Rfl:   ·  metoprolol tartrate (Lopressor) 50 mg tablet, Take 1 tablet by mouth 2 times a day., Disp: , Rfl:   ·  OneTouch Verio test strips strip, once daily. As directed, Disp: , Rfl:   ·  pantoprazole (Protonix) 40 mg EC tablet, Take 1 tablet (40 mg) by mouth 2 times a day., Disp: , Rfl:   ·  sertraline (Zoloft) 50 mg tablet, Take 0.5 tablets (25 mg) by mouth once daily., Disp: , Rfl:   ·  warfarin (Jantoven) 2.5 mg tablet, 1 tab(s) orally once a day 5 days a week (mon, tues, weds, friday, saturday), Disp: , Rfl:   ·  warfarin (Jantoven) 5 mg tablet, Take by mouth.  1 tab(s) orally once a day two days a week on thursday and sunday, Disp: , Rfl:   ·  lidocaine (Lidoderm) 5 % patch, apply topically to affected area once daily, Disp: , Rfl:   ·  polyethylene glycol-electrolytes 420 gram solution, DRINK HALF BY MOUTH BEGINNING AT 6 PM THE NIGHT BEFORE THE PROCEDURE AND START DRINKING THE SECOND HALF 5 HOURS BEFORE PROCEDURE TIME, Disp: , Rfl:           Review of Systems   Constitutional: Negative.  Negative for activity change.   Respiratory:  Negative for cough and shortness of breath.    Cardiovascular: Negative.    Gastrointestinal:  Negative for abdominal pain, constipation and diarrhea.   Genitourinary:  Negative for dysuria.   Musculoskeletal:  Negative for arthralgias.         Using voltaren gel when has pain   Skin:  Negative for rash.   Psychiatric/Behavioral: Negative.         Objective   /68   Pulse 68   Wt 101 kg (222 lb)   SpO2 98%   BMI 37.81 kg/m²     Physical Exam  Constitutional:       General: She is not in acute distress.     Appearance: Normal appearance.   Cardiovascular:      Rate and Rhythm: Normal rate. Rhythm irregular.      Heart sounds: Murmur heard.      No gallop.   Pulmonary:      Breath sounds: Normal breath sounds. No wheezing, rhonchi or rales.   Skin:     General: Skin is warm and dry.      Findings: No rash.   Neurological:      General: No focal deficit present.      Mental Status: She is alert and oriented to person, place, and time.   Psychiatric:         Mood and Affect: Mood normal.         Assessment/Plan  con't same regimen        Kyleigh was seen today for follow-up.  Diagnoses and all orders for this visit:  Type 2 diabetes mellitus with other circulatory complication, without long-term current use of insulin (CMS/HCC) (Primary)  -     POCT glycosylated hemoglobin (Hb A1C) manually resulted  Longstanding persistent atrial fibrillation (CMS/HCC)  Benign essential hypertension  Mixed hyperlipidemia  Vitamin D deficiency  Gastroesophageal reflux disease without esophagitis  Nephropathy  Depression with anxiety  Idiopathic chronic gout of multiple sites without tophus  History of recurrent TIAs  Obstructive sleep apnea syndrome  Other orders  -     Follow Up In Primary Care - Established; Future

## 2023-11-30 ENCOUNTER — ANTICOAGULATION - WARFARIN VISIT (OUTPATIENT)
Dept: CARDIOLOGY | Facility: CLINIC | Age: 78
End: 2023-11-30
Payer: MEDICARE

## 2023-11-30 DIAGNOSIS — I48.11 LONGSTANDING PERSISTENT ATRIAL FIBRILLATION (MULTI): Primary | ICD-10-CM

## 2023-11-30 LAB
POC INR: 2.6
POC PROTHROMBIN TIME: NORMAL

## 2023-11-30 PROCEDURE — 85610 PROTHROMBIN TIME: CPT | Mod: QW

## 2023-11-30 PROCEDURE — 99211 OFF/OP EST MAY X REQ PHY/QHP: CPT | Performed by: INTERNAL MEDICINE

## 2023-11-30 NOTE — PROGRESS NOTES
Patient identification verified with 2 identifiers.    Location: [facility] - [address]. [place], [place] [address] [phone]     Referring Physician: DR. CHEN  Enrollment/ Re-enrollment date: 24   INR Goal: 2.0-3.0  INR monitoring is per [facility] protocol.  Anticoagulation Medication: warfarin  Indication: Atrial Fibrillation/Atrial Flutter    Subjective   Bleeding signs/symptoms: No    Bruising: No   Major bleeding event: No  Thrombosis signs/symptoms: No  Thromboembolic event: No  Missed doses: No  Extra doses: No  Medication changes: Yes  PT HAS BEEN USING VOLTAREN CREAM FOR THE PAST  2 WEEKS.  Dietary changes: No  Change in health: No  Change in activity: No  Alcohol: No  Other concerns: No    Upcoming Surgeries:  Does the Patient Have any upcoming surgeries that require interruption in anticoagulation therapy? no  Does the patient require bridging? no      Anticoagulation Summary  As of 2023      INR goal:  2.0-3.0   TTR:  100.0 % (2 mo)   INR used for dosin.60 (2023)   Weekly warfarin total:  22.5 mg               Assessment/Plan   Therapeutic     1. New dose: no change    2. Next INR: 1 month      Education provided to patient during the visit:  Patient instructed to call in interim with questions, concerns and changes.   Patient educated on interactions between medications and warfarin.   Patient educated on dietary consistency in vitamin k consumption.   Patient educated on affects of alcohol consumption while taking warfarin.   Patient educated on signs of bleeding/clotting.   Patient educated on compliance with dosing, follow up appointments, and prescribed plan of care.

## 2023-12-07 ENCOUNTER — OFFICE VISIT (OUTPATIENT)
Dept: ORTHOPEDIC SURGERY | Facility: HOSPITAL | Age: 78
End: 2023-12-07
Payer: MEDICARE

## 2023-12-07 DIAGNOSIS — M67.813 TENDINOSIS OF RIGHT ROTATOR CUFF: Primary | ICD-10-CM

## 2023-12-07 PROCEDURE — 99213 OFFICE O/P EST LOW 20 MIN: CPT | Performed by: FAMILY MEDICINE

## 2023-12-07 PROCEDURE — 99203 OFFICE O/P NEW LOW 30 MIN: CPT | Performed by: FAMILY MEDICINE

## 2023-12-07 PROCEDURE — 1126F AMNT PAIN NOTED NONE PRSNT: CPT | Performed by: FAMILY MEDICINE

## 2023-12-07 PROCEDURE — 1159F MED LIST DOCD IN RCRD: CPT | Performed by: FAMILY MEDICINE

## 2023-12-07 ASSESSMENT — PAIN SCALES - GENERAL: PAINLEVEL_OUTOF10: 7

## 2023-12-07 ASSESSMENT — PAIN - FUNCTIONAL ASSESSMENT: PAIN_FUNCTIONAL_ASSESSMENT: 0-10

## 2023-12-07 NOTE — PROGRESS NOTES
"** Please excuse any errors in grammar or translation related to this dictation. Voice recognition software was utilized to prepare this document. **    Assessment & Plan:  Clinical presentation most suggestive of rotator cuff pathology as source of pain. Discuss with patient that many people develop degenerative tears/impingement from routine use \"wear and tear\". Treatment of this condition is targeted at reducing pain in order to maintain function. Mainstay of this treatment is physical therapy to improve strength and stability of shoulder; referral placed.  Offered to complete corticosteroid today to medicate her pain which she declined as has a history of hyperglycemia following knee steroid injections.  Unable to use oral NSAIDs due to anticoagulation.  Can can continue topical diclofenac gel or oral Tylenol as well as application of ice pack or heating pad as needed.  Return precautions given.  Follow-up as needed for persistence of symptoms following therapy.      Chief complaint:  Right arm pain    HPI:  77y/o hand dominant female, history of TAVR, diabetes, A-fib and bilateral knee arthritis, presents with right upper arm pain.  This complaint has been ongoing since September 2023.  Reports she had venous catheter placed in the right arm in July as part of a cardiac procedure which led to superficial phlebitis of the arm.  Symptoms have progressively worsened with time.  Pain is most prominent at deltoid, upper arm though it is also present in forearm and neck at times.  Symptoms are aggravated by lifting overhead to lift objects.  For example she states when she lifts arms to pull microwave down for use it is painful to do so.  To date, patient has tried a variety of treatments to include voltaren gel, tylenol, and ibuprofen with little sustained effect.     Exam:  RIGHT Shoulder Exam:  No swelling, warmth or ecchymosis of shoulder present.   AROM: Flexion 160 mo000bit; Abduction 150 [id number]; IR 70 of " 70deg at 90deg; ER 90 of 90deg at 90deg  TTP over deltoid  5/5 strength in ER, IR, abduction, flexion   SILT in all dermatomal distributions C5-T1  LABRUM:-O´tarah´s, + crank test  INSTABILITY:-apprehension  IMPINGEMENT: + Hawkin´s,-Neer´s, + painful arc  ROTATOR CUFF:-limb drop,-lag signs, + empty Can (SS), + stomach hold (SSc), + Hornblower (IS/TM)  BICEPS:-Speed´s  AC JOINT:-scarf test

## 2023-12-11 ENCOUNTER — TELEPHONE (OUTPATIENT)
Dept: PRIMARY CARE | Facility: CLINIC | Age: 78
End: 2023-12-11
Payer: MEDICARE

## 2023-12-11 NOTE — TELEPHONE ENCOUNTER
Patient said her feet have been swollen for the last week, she did order compression stockings, will be here on 12/13/2023. She is drinking more water. Is there anything else she should do? Please advise [phone].

## 2023-12-13 ENCOUNTER — TELEPHONE (OUTPATIENT)
Dept: PRIMARY CARE | Facility: CLINIC | Age: 78
End: 2023-12-13
Payer: MEDICARE

## 2023-12-13 NOTE — TELEPHONE ENCOUNTER
Patient was told to call with update today. She has been taking 1 Lasix daily, she has dropped 4lbs of water weight and her BP was 148/88 still has some swelling in her legs and feet. Her compression stockings will be here this afternoon. Please advise [phone].

## 2023-12-19 ENCOUNTER — OFFICE VISIT (OUTPATIENT)
Dept: CARDIOLOGY | Facility: HOSPITAL | Age: 78
End: 2023-12-19
Payer: MEDICARE

## 2023-12-19 VITALS
DIASTOLIC BLOOD PRESSURE: 80 MMHG | SYSTOLIC BLOOD PRESSURE: 157 MMHG | BODY MASS INDEX: 38.09 KG/M2 | HEART RATE: 70 BPM | WEIGHT: 228.6 LBS | HEIGHT: 65 IN

## 2023-12-19 DIAGNOSIS — I10 BENIGN ESSENTIAL HYPERTENSION: ICD-10-CM

## 2023-12-19 DIAGNOSIS — I48.91 ATRIAL FIBRILLATION, UNSPECIFIED TYPE (MULTI): ICD-10-CM

## 2023-12-19 DIAGNOSIS — E78.2 MIXED HYPERLIPIDEMIA: ICD-10-CM

## 2023-12-19 DIAGNOSIS — Z95.2 S/P TAVR (TRANSCATHETER AORTIC VALVE REPLACEMENT): Primary | ICD-10-CM

## 2023-12-19 PROCEDURE — 93005 ELECTROCARDIOGRAM TRACING: CPT | Performed by: NURSE PRACTITIONER

## 2023-12-19 PROCEDURE — 99214 OFFICE O/P EST MOD 30 MIN: CPT | Performed by: NURSE PRACTITIONER

## 2023-12-19 PROCEDURE — 1036F TOBACCO NON-USER: CPT | Performed by: NURSE PRACTITIONER

## 2023-12-19 PROCEDURE — 3079F DIAST BP 80-89 MM HG: CPT | Performed by: NURSE PRACTITIONER

## 2023-12-19 PROCEDURE — 1160F RVW MEDS BY RX/DR IN RCRD: CPT | Performed by: NURSE PRACTITIONER

## 2023-12-19 PROCEDURE — 1126F AMNT PAIN NOTED NONE PRSNT: CPT | Performed by: NURSE PRACTITIONER

## 2023-12-19 PROCEDURE — 3077F SYST BP >= 140 MM HG: CPT | Performed by: NURSE PRACTITIONER

## 2023-12-19 PROCEDURE — 1159F MED LIST DOCD IN RCRD: CPT | Performed by: NURSE PRACTITIONER

## 2023-12-19 PROCEDURE — 93010 ELECTROCARDIOGRAM REPORT: CPT | Performed by: INTERNAL MEDICINE

## 2023-12-19 ASSESSMENT — ENCOUNTER SYMPTOMS
OCCASIONAL FEELINGS OF UNSTEADINESS: 0
HYPERTENSION: 1
LOSS OF SENSATION IN FEET: 0
DEPRESSION: 0

## 2023-12-19 NOTE — PROGRESS NOTES
Subjective   Kyleigh Chappell is a 78 y.o. female.    Chief Complaint:  Atrial Fibrillation, Hypertension, and Hyperlipidemia    Mrs. Chappell returns for a 3 month follow up. She is seen in collaboration with Dr. Mandujano. She has been feeling well from a cardiac standpoint. She has remained compliant with her medications, denying any intolerances. She seems to be getting around reasonably well, denying any exertional chest pain or shortness of breath. She denies any recent ER visits or hospitalizations. She offers no specific cardiovascular complaints or concerns today. She denies any complaints of chest pain, shortness of breath, lightheadedness, dizziness, palpitations, syncope, orthopnea, paroxysmal nocturnal dyspnea, lower extremity swelling or bleeding concerns.      Atrial Fibrillation  Past medical history includes atrial fibrillation and hyperlipidemia.   Hypertension    Hyperlipidemia        Review of Systems   All other systems reviewed and are negative.      Objective   Physical Exam  Constitutional:       Appearance: Healthy appearance. In no distress  Pulmonary:      Effort: Pulmonary effort is normal.      Breath sounds: Normal breath sounds.   Cardiovascular:      Normal rate. Irregularly irregular rhythm. Normal S1. Normal S2.       Murmurs: There is no murmur.      Carotids: right carotid pulse +2, no bruit heard over the right carotid. left carotid pulse +2, no bruit heard over the left carotid.  Edema:     Peripheral edema absent.   Abdominal:      Palpations: Abdomen is soft.   Musculoskeletal:       Cervical back: Normal range of motion.   Skin:     General: Skin is warm and dry. Normal color and pigmentation   Neurological:      Mental Status: Alert and oriented to person, place and time.   Psychiatric:     Mood and Affect: appropriate mood and appropriate affect.     EKG obtained and reviewed. Atrial fibrillation. Right bundle branch block. HR 70    Lab Review:   Lab Results   Component  Value Date     09/13/2023    K 4.8 09/13/2023     09/13/2023    CO2 25 09/13/2023    BUN 32 (H) 09/13/2023    CREATININE 1.69 (H) 09/13/2023    GLUCOSE 280 (H) 09/13/2023    CALCIUM 9.2 09/13/2023     Lab Results   Component Value Date    WBC 6.1 09/13/2023    HGB 11.3 (L) 09/13/2023    HCT 35.6 (L) 09/13/2023    MCV 91 09/13/2023     09/13/2023     Lab Results   Component Value Date    CHOL 177 02/08/2023    TRIG 192 (H) 02/08/2023    HDL 47 (L) 02/08/2023       Assessment/Plan   Mrs. Chappell is a 78 year old  female with a past medical history significant for hypertension, hyperlipidemia, chronic atrial fibrillation on warfarin anticoagulation, diabetes, CKD (solitary kidney), elevated CACS of 327.56 and aortic stenosis s/p TAVR with Evolut FX 29mm on 8/8/23. Heart catheterization 7/2023 showed no significant obstructive CAD. She presents today for routine follow up stable from a cardiac standpoint. Her BP is elevated, though she did not take her medications this morning and she has been under some stress. She seems to be getting around reasonably well, denying any exertional intolerances. I will have her continue all medications unchanged. She will follow up with us in clinic in August with an echocardiogram the same day. She knows to call for any concerns.

## 2023-12-20 LAB
ATRIAL RATE: 88 BPM
Q ONSET: 191 MS
QRS COUNT: 11 BEATS
QRS DURATION: 138 MS
QT INTERVAL: 406 MS
QTC CALCULATION(BAZETT): 438 MS
QTC FREDERICIA: 427 MS
R AXIS: 267 DEGREES
T AXIS: -13 DEGREES
T OFFSET: 394 MS
VENTRICULAR RATE: 70 BPM

## 2023-12-28 ENCOUNTER — ANTICOAGULATION - WARFARIN VISIT (OUTPATIENT)
Dept: CARDIOLOGY | Facility: CLINIC | Age: 78
End: 2023-12-28
Payer: MEDICARE

## 2023-12-28 ENCOUNTER — APPOINTMENT (OUTPATIENT)
Dept: CARDIOLOGY | Facility: CLINIC | Age: 78
End: 2023-12-28
Payer: MEDICARE

## 2023-12-28 DIAGNOSIS — I48.11 LONGSTANDING PERSISTENT ATRIAL FIBRILLATION (MULTI): Primary | ICD-10-CM

## 2023-12-29 ENCOUNTER — ANTICOAGULATION - WARFARIN VISIT (OUTPATIENT)
Dept: CARDIOLOGY | Facility: CLINIC | Age: 78
End: 2023-12-29
Payer: MEDICARE

## 2023-12-29 DIAGNOSIS — I48.91 ATRIAL FIBRILLATION, UNSPECIFIED TYPE (MULTI): Primary | ICD-10-CM

## 2023-12-29 LAB
POC INR: 1.7
POC PROTHROMBIN TIME: NORMAL

## 2023-12-29 PROCEDURE — 99211 OFF/OP EST MAY X REQ PHY/QHP: CPT | Mod: 27 | Performed by: INTERNAL MEDICINE

## 2023-12-29 PROCEDURE — 85610 PROTHROMBIN TIME: CPT | Mod: QW

## 2023-12-29 NOTE — PROGRESS NOTES
Patient identification verified with 2 identifiers.    Location: [facility] - [address] [facility] [address] [facility]. [place], [place] [address] [phone]     Referring Physician: Dr Mandujano  Enrollment/ Re-enrollment date: 2024   INR Goal: 2.0-3.0  INR monitoring is per [facility] protocol.  Anticoagulation Medication: warfarin  Indication: Atrial Fibrillation/Atrial Flutter    Subjective   Bleeding signs/symptoms: No    Bruising: No   Major bleeding event: No  Thrombosis signs/symptoms: No  Thromboembolic event: No  Missed doses: No  Extra doses: No  Medication changes: No  Dietary changes: No  Change in health: No  Change in activity: No  Alcohol: No  Other concerns: No    Upcoming Surgeries:  Does the Patient Have any upcoming surgeries that require interruption in anticoagulation therapy? no  Does the patient require bridging? no      Anticoagulation Summary  As of 2023      INR goal:  2.0-3.0   TTR:  89.2 % (3 mo)   INR used for dosin.70 (2023)   Weekly warfarin total:  22.5 mg               Assessment/Plan   Subtherapeutic     1. New dose:  maintain weekly dose, one time extra dose today of 2.5mg      2. Next INR: 1 week      Education provided to patient during the visit:  Patient instructed to call in interim with questions, concerns and changes.   Patient educated on interactions between medications and warfarin.   Patient educated on dietary consistency in vitamin k consumption.   Patient educated on affects of alcohol consumption while taking warfarin.   Patient educated on signs of bleeding/clotting.   Patient educated on compliance with dosing, follow up appointments, and prescribed plan of care.

## 2024-01-04 ENCOUNTER — ANTICOAGULATION - WARFARIN VISIT (OUTPATIENT)
Dept: CARDIOLOGY | Facility: CLINIC | Age: 79
End: 2024-01-04
Payer: MEDICARE

## 2024-01-04 DIAGNOSIS — I48.11 LONGSTANDING PERSISTENT ATRIAL FIBRILLATION (MULTI): Primary | ICD-10-CM

## 2024-01-04 LAB
POC INR: 1.8
POC PROTHROMBIN TIME: NORMAL

## 2024-01-04 PROCEDURE — 85610 PROTHROMBIN TIME: CPT | Mod: QW

## 2024-01-04 NOTE — PROGRESS NOTES
Patient identification verified with 2 identifiers.    Location: [facility] - [address] [facility]. [place], [place] [address] [phone]     Referring Physician: DR. CHEN  Enrollment/ Re-enrollment date: 24   INR Goal: 2.0-3.0  INR monitoring is per [facility] protocol.  Anticoagulation Medication: warfarin  Indication: Atrial Fibrillation/Atrial Flutter    Subjective   Bleeding signs/symptoms: No    Bruising: No   Major bleeding event: No  Thrombosis signs/symptoms: No  Thromboembolic event: No  Missed doses: No  Extra doses: No  Medication changes: No  Dietary changes: Yes  PT ATE A LOT OF CABBAGE SOUP AND STUFFED CABBAGE THIS PAST WEEK.  SHE WILL RESUME CONSISTENT DIET.  Change in health: No  Change in activity: No  Alcohol: No  Other concerns: No    Upcoming Surgeries:  Does the Patient Have any upcoming surgeries that require interruption in anticoagulation therapy? no  Does the patient require bridging? no      Anticoagulation Summary  As of 2024      INR goal:  2.0-3.0   TTR:  83.6 % (3.2 mo)   INR used for dosin.80 (2024)   Weekly warfarin total:  22.5 mg               Assessment/Plan   Subtherapeutic     1. New dose:  PT WILL TAKE AN EXTRA 2.5MG TODAY, THEN RESUME CURRENT DOSING      2. Next INR: 1 week      Education provided to patient during the visit:  Patient instructed to call in interim with questions, concerns and changes.   Patient educated on interactions between medications and warfarin.   Patient educated on dietary consistency in vitamin k consumption.   Patient educated on affects of alcohol consumption while taking warfarin.   Patient educated on signs of bleeding/clotting.   Patient educated on compliance with dosing, follow up appointments, and prescribed plan of care.

## 2024-01-11 ENCOUNTER — ANTICOAGULATION - WARFARIN VISIT (OUTPATIENT)
Dept: CARDIOLOGY | Facility: CLINIC | Age: 79
End: 2024-01-11
Payer: MEDICARE

## 2024-01-11 DIAGNOSIS — I48.91 ATRIAL FIBRILLATION, UNSPECIFIED TYPE (MULTI): Primary | ICD-10-CM

## 2024-01-11 LAB
POC INR: 1.9
POC PROTHROMBIN TIME: NORMAL

## 2024-01-11 PROCEDURE — 85610 PROTHROMBIN TIME: CPT | Mod: QW

## 2024-01-11 PROCEDURE — 99211 OFF/OP EST MAY X REQ PHY/QHP: CPT | Performed by: INTERNAL MEDICINE

## 2024-01-11 NOTE — PROGRESS NOTES
Patient identification verified with 2 identifiers.    Location: [facility] - [address] [facility]. [place], [place] [address] [phone]     Referring Physician: Dr Golden Mandujano  Enrollment/ Re-enrollment date: 2024   INR Goal: 2.0-3.0  INR monitoring is per [facility] protocol.  Anticoagulation Medication: warfarin  Indication: Atrial Fibrillation/Atrial Flutter    Subjective   Bleeding signs/symptoms: No    Bruising: No   Major bleeding event: No  Thrombosis signs/symptoms: No  Thromboembolic event: No  Missed doses: No  Extra doses: No  Medication changes: No  Dietary changes: No  Change in health: No  Change in activity: No  Alcohol: No  Other concerns: No    Upcoming Surgeries:  Does the Patient Have any upcoming surgeries that require interruption in anticoagulation therapy? no  Does the patient require bridging? no      Anticoagulation Summary  As of 2024      INR goal:  2.0-3.0   TTR:  77.9 % (3.4 mo)   INR used for dosin.90 (2024)   Weekly warfarin total:  22.5 mg               Assessment/Plan   Subtherapeutic  Discussed with patient repeated subtherapeitic INR, patient declines to increase warfarin dose. Patient prefers to adjust diet.     1. New dose: no change    2. Next INR: 1 week      Education provided to patient during the visit:  Patient instructed to call in interim with questions, concerns and changes.   Patient educated on interactions between medications and warfarin.   Patient educated on dietary consistency in vitamin k consumption.   Patient educated on affects of alcohol consumption while taking warfarin.   Patient educated on signs of bleeding/clotting.   Patient educated on compliance with dosing, follow up appointments, and prescribed plan of care.

## 2024-01-18 ENCOUNTER — ANTICOAGULATION - WARFARIN VISIT (OUTPATIENT)
Dept: CARDIOLOGY | Facility: CLINIC | Age: 79
End: 2024-01-18
Payer: MEDICARE

## 2024-01-18 DIAGNOSIS — I48.11 LONGSTANDING PERSISTENT ATRIAL FIBRILLATION (MULTI): Primary | ICD-10-CM

## 2024-01-18 LAB
POC INR: 1.6
POC PROTHROMBIN TIME: NORMAL

## 2024-01-18 PROCEDURE — 99211 OFF/OP EST MAY X REQ PHY/QHP: CPT | Performed by: INTERNAL MEDICINE

## 2024-01-18 PROCEDURE — 85610 PROTHROMBIN TIME: CPT | Mod: QW

## 2024-01-18 NOTE — PROGRESS NOTES
Patient identification verified with 2 identifiers.    Location: [facility] - [address] [facility]. [place], [place] [address] [phone]     Referring Physician: DR. CHEN  Enrollment/ Re-enrollment date: 24   INR Goal: 2.0-3.0  INR monitoring is per [facility] protocol.  Anticoagulation Medication: warfarin  Indication: Atrial Fibrillation/Atrial Flutter    Subjective   Bleeding signs/symptoms: No    Bruising: No   Major bleeding event: No  Thrombosis signs/symptoms: No  Thromboembolic event: No  Missed doses: No  Extra doses: No  Medication changes: No  Dietary changes: Yes  PT HAS BEEN EATING LIGHTER GREEN VEGETABLES THIS PAST WEEK AND WILL CONTINUE TO EAT THE SAME KIND THIS WEEK.  Change in health: No  Change in activity: No  Alcohol: No  Other concerns: No    Upcoming Surgeries:  Does the Patient Have any upcoming surgeries that require interruption in anticoagulation therapy? no  Does the patient require bridging? no      Anticoagulation Summary  As of 2024      INR goal:  2.0-3.0   TTR:  72.9 % (3.6 mo)   INR used for dosin.60 (2024)   Weekly warfarin total:  25 mg               Assessment/Plan   Subtherapeutic     1. New dose:  PT WILL INCREASE WEEKLY DOSE OF WARFARIN     2. Next INR: 1 week      Education provided to patient during the visit:  Patient instructed to call in interim with questions, concerns and changes.   Patient educated on interactions between medications and warfarin.   Patient educated on dietary consistency in vitamin k consumption.   Patient educated on affects of alcohol consumption while taking warfarin.   Patient educated on signs of bleeding/clotting.   Patient educated on compliance with dosing, follow up appointments, and prescribed plan of care.

## 2024-01-25 ENCOUNTER — ANTICOAGULATION - WARFARIN VISIT (OUTPATIENT)
Dept: CARDIOLOGY | Facility: CLINIC | Age: 79
End: 2024-01-25
Payer: MEDICARE

## 2024-01-25 DIAGNOSIS — I48.11 LONGSTANDING PERSISTENT ATRIAL FIBRILLATION (MULTI): Primary | ICD-10-CM

## 2024-01-25 LAB
POC INR: 2.3
POC PROTHROMBIN TIME: NORMAL

## 2024-01-25 PROCEDURE — 99211 OFF/OP EST MAY X REQ PHY/QHP: CPT | Performed by: INTERNAL MEDICINE

## 2024-01-25 PROCEDURE — 85610 PROTHROMBIN TIME: CPT | Mod: QW

## 2024-01-25 NOTE — PROGRESS NOTES
Patient identification verified with 2 identifiers.    Location: [facility] - [address] [facility]. [place], [place] [address] [phone]     Referring Physician: DR. CHEN  Enrollment/ Re-enrollment date: 24   INR Goal: 2.0-3.0  INR monitoring is per [facility] protocol.  Anticoagulation Medication: warfarin  Indication: Atrial Fibrillation/Atrial Flutter    Subjective   Bleeding signs/symptoms: No    Bruising: No   Major bleeding event: No  Thrombosis signs/symptoms: No  Thromboembolic event: No  Missed doses: No  Extra doses: No  Medication changes: No  Dietary changes: No  Change in health: No  Change in activity: No  Alcohol: No  Other concerns: No    Upcoming Surgeries:  Does the Patient Have any upcoming surgeries that require interruption in anticoagulation therapy? no  Does the patient require bridging? no      Anticoagulation Summary  As of 2024      INR goal:  2.0-3.0   TTR:  71.1 % (3.9 mo)   INR used for dosin.30 (2024)   Weekly warfarin total:  25 mg               Assessment/Plan   Therapeutic     1. New dose: no change    2. Next INR: 1 week      Education provided to patient during the visit:  Patient instructed to call in interim with questions, concerns and changes.   Patient educated on interactions between medications and warfarin.   Patient educated on dietary consistency in vitamin k consumption.   Patient educated on affects of alcohol consumption while taking warfarin.   Patient educated on signs of bleeding/clotting.   Patient educated on compliance with dosing, follow up appointments, and prescribed plan of care.

## 2024-01-26 DIAGNOSIS — I48.11 LONGSTANDING PERSISTENT ATRIAL FIBRILLATION (MULTI): ICD-10-CM

## 2024-01-26 DIAGNOSIS — E11.59 TYPE 2 DIABETES MELLITUS WITH OTHER CIRCULATORY COMPLICATION, WITHOUT LONG-TERM CURRENT USE OF INSULIN (MULTI): ICD-10-CM

## 2024-01-26 RX ORDER — WARFARIN SODIUM 5 MG/1
TABLET ORAL
Qty: 90 TABLET | Refills: 1 | Status: SHIPPED | OUTPATIENT
Start: 2024-01-26

## 2024-02-01 ENCOUNTER — ANTICOAGULATION - WARFARIN VISIT (OUTPATIENT)
Dept: CARDIOLOGY | Facility: CLINIC | Age: 79
End: 2024-02-01
Payer: MEDICARE

## 2024-02-01 DIAGNOSIS — I48.11 LONGSTANDING PERSISTENT ATRIAL FIBRILLATION (MULTI): Primary | ICD-10-CM

## 2024-02-01 LAB
POC INR: 2.1
POC PROTHROMBIN TIME: NORMAL

## 2024-02-01 PROCEDURE — 99211 OFF/OP EST MAY X REQ PHY/QHP: CPT | Performed by: INTERNAL MEDICINE

## 2024-02-01 PROCEDURE — 85610 PROTHROMBIN TIME: CPT | Mod: QW

## 2024-02-01 NOTE — PROGRESS NOTES
Patient identification verified with 2 identifiers.    Location: [facility] - [address] [facility] [address] [facility]. [place], [place] [address] [phone]     Referring Physician: DR. CHEN  Enrollment/ Re-enrollment date: 24   INR Goal: 2.0-3.0  INR monitoring is per [facility] protocol.  Anticoagulation Medication: warfarin  Indication: Atrial Fibrillation/Atrial Flutter    Subjective   Bleeding signs/symptoms: No    Bruising: No   Major bleeding event: No  Thrombosis signs/symptoms: No  Thromboembolic event: No  Missed doses: No  Extra doses: No  Medication changes: No  Dietary changes: Yes  PT ATE SPRING MIX LETTUCE INSTEAD OF HER NORMAL ICEBERG THIS PAST WEEK  Change in health: No  Change in activity: No  Alcohol: No  Other concerns: No    Upcoming Surgeries:  Does the Patient Have any upcoming surgeries that require interruption in anticoagulation therapy? no  Does the patient require bridging? no      Anticoagulation Summary  As of 2024      INR goal:  2.0-3.0   TTR:  72.7 % (4.1 mo)   INR used for dosin.10 (2024)   Weekly warfarin total:  25 mg               Assessment/Plan   Therapeutic     1. New dose: no change    2. Next INR: 2 weeks      Education provided to patient during the visit:  Patient instructed to call in interim with questions, concerns and changes.   Patient educated on interactions between medications and warfarin.   Patient educated on dietary consistency in vitamin k consumption.   Patient educated on affects of alcohol consumption while taking warfarin.   Patient educated on signs of bleeding/clotting.   Patient educated on compliance with dosing, follow up appointments, and prescribed plan of care.

## 2024-02-02 DIAGNOSIS — E11.59 TYPE 2 DIABETES MELLITUS WITH OTHER CIRCULATORY COMPLICATION, WITHOUT LONG-TERM CURRENT USE OF INSULIN (MULTI): ICD-10-CM

## 2024-02-02 RX ORDER — GLIMEPIRIDE 1 MG/1
4 TABLET ORAL
Qty: 180 TABLET | Refills: 3 | Status: SHIPPED | OUTPATIENT
Start: 2024-02-02 | End: 2024-02-07 | Stop reason: DRUGHIGH

## 2024-02-07 ENCOUNTER — NURSE ONLY (OUTPATIENT)
Dept: PRIMARY CARE | Facility: CLINIC | Age: 79
End: 2024-02-07
Payer: MEDICARE

## 2024-02-07 DIAGNOSIS — E11.59 TYPE 2 DIABETES MELLITUS WITH OTHER CIRCULATORY COMPLICATION, WITHOUT LONG-TERM CURRENT USE OF INSULIN (MULTI): ICD-10-CM

## 2024-02-07 RX ORDER — GLIMEPIRIDE 2 MG/1
2 TABLET ORAL
Qty: 90 TABLET | Refills: 3 | Status: SHIPPED | OUTPATIENT
Start: 2024-02-07

## 2024-02-07 RX ORDER — GLIMEPIRIDE 2 MG/1
2 TABLET ORAL
COMMUNITY
End: 2024-02-07 | Stop reason: SDUPTHER

## 2024-02-15 ENCOUNTER — ANTICOAGULATION - WARFARIN VISIT (OUTPATIENT)
Dept: CARDIOLOGY | Facility: CLINIC | Age: 79
End: 2024-02-15
Payer: MEDICARE

## 2024-02-15 ENCOUNTER — LAB (OUTPATIENT)
Dept: LAB | Facility: LAB | Age: 79
End: 2024-02-15
Payer: MEDICARE

## 2024-02-15 DIAGNOSIS — N18.30 CHRONIC KIDNEY DISEASE, STAGE 3 UNSPECIFIED (MULTI): Primary | ICD-10-CM

## 2024-02-15 DIAGNOSIS — E78.49 OTHER HYPERLIPIDEMIA: ICD-10-CM

## 2024-02-15 DIAGNOSIS — I10 ESSENTIAL (PRIMARY) HYPERTENSION: ICD-10-CM

## 2024-02-15 DIAGNOSIS — I48.11 LONGSTANDING PERSISTENT ATRIAL FIBRILLATION (MULTI): Primary | ICD-10-CM

## 2024-02-15 DIAGNOSIS — E11.21 TYPE 2 DIABETES MELLITUS WITH DIABETIC NEPHROPATHY (MULTI): ICD-10-CM

## 2024-02-15 LAB
ALBUMIN SERPL BCP-MCNC: 4.2 G/DL (ref 3.4–5)
ANION GAP SERPL CALC-SCNC: 16 MMOL/L (ref 10–20)
BUN SERPL-MCNC: 38 MG/DL (ref 6–23)
CALCIUM SERPL-MCNC: 9.3 MG/DL (ref 8.6–10.6)
CHLORIDE SERPL-SCNC: 103 MMOL/L (ref 98–107)
CO2 SERPL-SCNC: 26 MMOL/L (ref 21–32)
CREAT SERPL-MCNC: 1.7 MG/DL (ref 0.5–1.05)
EGFRCR SERPLBLD CKD-EPI 2021: 30 ML/MIN/1.73M*2
GLUCOSE SERPL-MCNC: 187 MG/DL (ref 74–99)
PHOSPHATE SERPL-MCNC: 5.2 MG/DL (ref 2.5–4.9)
POC INR: 2.1
POC PROTHROMBIN TIME: NORMAL
POTASSIUM SERPL-SCNC: 4.9 MMOL/L (ref 3.5–5.3)
SODIUM SERPL-SCNC: 140 MMOL/L (ref 136–145)

## 2024-02-15 PROCEDURE — 36415 COLL VENOUS BLD VENIPUNCTURE: CPT

## 2024-02-15 PROCEDURE — 80069 RENAL FUNCTION PANEL: CPT

## 2024-02-15 PROCEDURE — 85610 PROTHROMBIN TIME: CPT | Mod: QW

## 2024-02-15 PROCEDURE — 99211 OFF/OP EST MAY X REQ PHY/QHP: CPT | Performed by: INTERNAL MEDICINE

## 2024-02-15 NOTE — PROGRESS NOTES
Patient identification verified with 2 identifiers.    Location: [facility] - [address] [facility]. [place], [place] [address] [phone]     Referring Physician: DR. CHEN  Enrollment/ Re-enrollment date: 24  INR Goal: 2.0-3.0  INR monitoring is per [facility] protocol.  Anticoagulation Medication: warfarin  Indication: Atrial Fibrillation/Atrial Flutter    Subjective   Bleeding signs/symptoms: No    Bruising: No   Major bleeding event: No  Thrombosis signs/symptoms: No  Thromboembolic event: No  Missed doses: No  Extra doses: No  Medication changes: No  Dietary changes: No  Change in health: No  Change in activity: No  Alcohol: No  Other concerns: No    Upcoming Procedures:  Does the Patient Have any upcoming procedures that require interruption in anticoagulation therapy? no  Does the patient require bridging? no      Anticoagulation Summary  As of 2/15/2024      INR goal:  2.0-3.0   TTR:  75.5 % (4.6 mo)   INR used for dosin.10 (2/15/2024)   Weekly warfarin total:  25 mg               Assessment/Plan   Therapeutic     1. New dose: no change    2. Next INR: 2 weeks      Education provided to patient during the visit:  Patient instructed to call in interim with questions, concerns and changes.   Patient educated on interactions between medications and warfarin.   Patient educated on dietary consistency in vitamin k consumption.   Patient educated on affects of alcohol consumption while taking warfarin.   Patient educated on signs of bleeding/clotting.   Patient educated on compliance with dosing, follow up appointments, and prescribed plan of care.

## 2024-02-16 ENCOUNTER — LAB (OUTPATIENT)
Dept: LAB | Facility: LAB | Age: 79
End: 2024-02-16
Payer: MEDICARE

## 2024-02-16 LAB
APPEARANCE UR: CLEAR
BILIRUB UR STRIP.AUTO-MCNC: NEGATIVE MG/DL
COLOR UR: ABNORMAL
CREAT UR-MCNC: 45.6 MG/DL (ref 20–320)
CREAT UR-MCNC: 45.6 MG/DL (ref 20–320)
GLUCOSE UR STRIP.AUTO-MCNC: ABNORMAL MG/DL
KETONES UR STRIP.AUTO-MCNC: NEGATIVE MG/DL
LEUKOCYTE ESTERASE UR QL STRIP.AUTO: NEGATIVE
MICROALBUMIN UR-MCNC: <7 MG/L
MICROALBUMIN/CREAT UR: NORMAL MG/G{CREAT}
NITRITE UR QL STRIP.AUTO: NEGATIVE
PH UR STRIP.AUTO: 5.5 [PH]
PROT UR STRIP.AUTO-MCNC: NEGATIVE MG/DL
PROT UR-ACNC: 7 MG/DL (ref 5–24)
PROT/CREAT UR: 0.15 MG/MG CREAT (ref 0–0.17)
RBC # UR STRIP.AUTO: NEGATIVE /UL
SP GR UR STRIP.AUTO: 1.01
UROBILINOGEN UR STRIP.AUTO-MCNC: NORMAL MG/DL

## 2024-02-16 PROCEDURE — 81003 URINALYSIS AUTO W/O SCOPE: CPT

## 2024-02-16 PROCEDURE — 82043 UR ALBUMIN QUANTITATIVE: CPT

## 2024-02-16 PROCEDURE — 84156 ASSAY OF PROTEIN URINE: CPT

## 2024-02-16 PROCEDURE — 82570 ASSAY OF URINE CREATININE: CPT

## 2024-02-23 ENCOUNTER — OFFICE VISIT (OUTPATIENT)
Dept: PRIMARY CARE | Facility: CLINIC | Age: 79
End: 2024-02-23
Payer: MEDICARE

## 2024-02-23 VITALS
HEART RATE: 56 BPM | WEIGHT: 226.6 LBS | BODY MASS INDEX: 37.71 KG/M2 | OXYGEN SATURATION: 98 % | DIASTOLIC BLOOD PRESSURE: 58 MMHG | SYSTOLIC BLOOD PRESSURE: 136 MMHG

## 2024-02-23 DIAGNOSIS — F41.8 DEPRESSION WITH ANXIETY: ICD-10-CM

## 2024-02-23 DIAGNOSIS — I48.11 LONGSTANDING PERSISTENT ATRIAL FIBRILLATION (MULTI): ICD-10-CM

## 2024-02-23 DIAGNOSIS — Z86.73 HISTORY OF RECURRENT TIAS: ICD-10-CM

## 2024-02-23 DIAGNOSIS — N28.9 NEPHROPATHY: ICD-10-CM

## 2024-02-23 DIAGNOSIS — I10 BENIGN ESSENTIAL HYPERTENSION: ICD-10-CM

## 2024-02-23 DIAGNOSIS — G47.33 OBSTRUCTIVE SLEEP APNEA SYNDROME: ICD-10-CM

## 2024-02-23 DIAGNOSIS — E78.2 MIXED HYPERLIPIDEMIA: ICD-10-CM

## 2024-02-23 DIAGNOSIS — E11.59 TYPE 2 DIABETES MELLITUS WITH OTHER CIRCULATORY COMPLICATION, WITHOUT LONG-TERM CURRENT USE OF INSULIN (MULTI): Primary | ICD-10-CM

## 2024-02-23 DIAGNOSIS — M1A.09X0 IDIOPATHIC CHRONIC GOUT OF MULTIPLE SITES WITHOUT TOPHUS: ICD-10-CM

## 2024-02-23 DIAGNOSIS — E55.9 VITAMIN D DEFICIENCY: ICD-10-CM

## 2024-02-23 DIAGNOSIS — Z95.2 S/P TAVR (TRANSCATHETER AORTIC VALVE REPLACEMENT): ICD-10-CM

## 2024-02-23 DIAGNOSIS — K21.9 GASTROESOPHAGEAL REFLUX DISEASE WITHOUT ESOPHAGITIS: ICD-10-CM

## 2024-02-23 LAB — POC HEMOGLOBIN A1C: 8.2 % (ref 4.2–6.5)

## 2024-02-23 PROCEDURE — 1126F AMNT PAIN NOTED NONE PRSNT: CPT | Performed by: INTERNAL MEDICINE

## 2024-02-23 PROCEDURE — 1157F ADVNC CARE PLAN IN RCRD: CPT | Performed by: INTERNAL MEDICINE

## 2024-02-23 PROCEDURE — 83036 HEMOGLOBIN GLYCOSYLATED A1C: CPT | Mod: QW | Performed by: INTERNAL MEDICINE

## 2024-02-23 PROCEDURE — 1036F TOBACCO NON-USER: CPT | Performed by: INTERNAL MEDICINE

## 2024-02-23 PROCEDURE — 1159F MED LIST DOCD IN RCRD: CPT | Performed by: INTERNAL MEDICINE

## 2024-02-23 PROCEDURE — 3078F DIAST BP <80 MM HG: CPT | Performed by: INTERNAL MEDICINE

## 2024-02-23 PROCEDURE — 99214 OFFICE O/P EST MOD 30 MIN: CPT | Performed by: INTERNAL MEDICINE

## 2024-02-23 PROCEDURE — 3075F SYST BP GE 130 - 139MM HG: CPT | Performed by: INTERNAL MEDICINE

## 2024-02-23 ASSESSMENT — ENCOUNTER SYMPTOMS
LOSS OF SENSATION IN FEET: 0
ACTIVITY CHANGE: 0
OCCASIONAL FEELINGS OF UNSTEADINESS: 0
WEAKNESS: 1
ARTHRALGIAS: 1
DEPRESSION: 0
DIARRHEA: 0
COUGH: 0
PSYCHIATRIC NEGATIVE: 1
DYSURIA: 0
CONSTIPATION: 0
ABDOMINAL PAIN: 0
CONSTITUTIONAL NEGATIVE: 1
CARDIOVASCULAR NEGATIVE: 1
SHORTNESS OF BREATH: 0

## 2024-02-23 ASSESSMENT — PAIN SCALES - GENERAL: PAINLEVEL: 0-NO PAIN

## 2024-02-23 NOTE — PROGRESS NOTES
Subjective   Patient ID: Kyleigh Chappell is a 78 y.o. female who presents for 3 month follow up .    Atrial Fibrillation--stable on meds-remains on coumadin-adjusted by cardio. Managed by  Dr Mandujano    S/p aortic valve repair-monitored by Dr Mandujano    NIDDM 2--stable on meds.  Sugars at home:  140-150 x/ when on steroids .  Lab Results       Component                Value               Date                       HGBA1C                   8.8 (A)             11/20/2023                                             8.2                    2/24/2024            GERD-stable on  protonix    Gout--last uric acid 5.6 . On allopurinol. Last flare after lobster    Depression with anxiety---fairly stable w/ sertraline and rarely needs xanax    CRF-9/2023 GFR 34    Sleep apnea-uses cpap nightly    H/o recurrant TIA's-no symptoms since on coumadin    Exercise started at rec center-water exercises, floor exercises       Diet tries to watch carbs      Current Outpatient Medications:   ·  allopurinol (Zyloprim) 300 mg tablet, Take 1 tablet (300 mg) by mouth once daily., Disp: , Rfl:   ·  ALPRAZolam (Xanax) 0.25 mg tablet, TAKE 1/2 TO 1 TABLET BY MOUTH THREE TIMES A DAY if needed for 30 days, Disp: , Rfl:   ·  ascorbic acid (Vitamin C) 500 mg tablet, Take 1 tablet (500 mg) by mouth 2 times a day. With iron tablet, Disp: , Rfl:   ·  aspirin 81 mg chewable tablet, Chew 1 tablet (81 mg) once daily., Disp: , Rfl:   ·  cholecalciferol, vitamin D3, (VITAMIN D3 ORAL), Take 1 capsule by mouth 2 times a week., Disp: , Rfl:   ·  colesevelam (Welchol) 625 mg tablet, Take 3 tablets (1,875 mg) by mouth 2 times a day with meals., Disp: , Rfl:   ·  diclofenac sodium (Voltaren) 1 % gel gel, Apply topically 3 times a day., Disp: , Rfl:   ·  dilTIAZem CD (Cardizem CD) 180 mg 24 hr capsule, TAKE ONE CAPSULE BY MOUTH EVERY DAY, Disp: 90 capsule, Rfl: 3  ·  empagliflozin (Jardiance) 25 mg, Take 1 tablet (25 mg) by mouth once daily., Disp: 90  tablet, Rfl: 3  ·  ergocalciferol (Vitamin D-2) 1.25 MG (69999 UT) capsule, Take 1 capsule (1,250 mcg) by mouth 2 times a week., Disp: , Rfl:   ·  ezetimibe (Zetia) 10 mg tablet, Take 1 tablet (10 mg) by mouth once daily at bedtime., Disp: , Rfl:   ·  furosemide (Lasix) 20 mg tablet, Take 0.5 tablets (10 mg) by mouth once daily., Disp: , Rfl:   ·  glimepiride (Amaryl) 2 mg tablet, Take 1 tablet (2 mg) by mouth once daily in the morning. Take before meals., Disp: 90 tablet, Rfl: 3  ·  lancets (OneTouch Delica Plus Lancet) 33 gauge misc, use daily, Disp: 100 each, Rfl: 3  ·  lancets misc, once daily., Disp: , Rfl:   ·  metFORMIN (Glucophage) 500 mg tablet, Take 1 tablet (500 mg) by mouth twice a day., Disp: , Rfl:   ·  metoprolol tartrate (Lopressor) 50 mg tablet, Take 1 tablet by mouth 2 times a day., Disp: , Rfl:   ·  OneTouch Verio test strips strip, once daily. As directed, Disp: , Rfl:   ·  pantoprazole (Protonix) 40 mg EC tablet, Take 1 tablet (40 mg) by mouth 2 times a day., Disp: , Rfl:   ·  sertraline (Zoloft) 50 mg tablet, Take 0.5 tablets (25 mg) by mouth once daily., Disp: , Rfl:   ·  warfarin (Jantoven) 2.5 mg tablet, 1 tab(s) orally once a day 5 days a week (mon, tues, weds, friday, saturday), Disp: , Rfl:   ·  warfarin (Jantoven) 5 mg tablet, 1 tab(s) orally once three days a week and 2.5mg four days a week., Disp: 90 tablet, Rfl: 1              Review of Systems   Constitutional: Negative.  Negative for activity change.        Slowly losing weight   Respiratory:  Negative for cough and shortness of breath.    Cardiovascular: Negative.         Able to exercise without difficulty   Gastrointestinal:  Negative for abdominal pain, constipation and diarrhea.   Genitourinary:  Negative for dysuria.   Musculoskeletal:  Positive for arthralgias.        Using voltaren gel when has pain   Skin:  Negative for rash.   Neurological:  Positive for weakness (legs already stronger with exercise).    Psychiatric/Behavioral: Negative.          Making new friends at exercise       Objective   /58 (BP Location: Left arm, Patient Position: Sitting)   Pulse 56   Wt 103 kg (226 lb 9.6 oz)   SpO2 98%   BMI 37.71 kg/m²     Physical Exam  Constitutional:       General: She is not in acute distress.     Appearance: Normal appearance.   Cardiovascular:      Rate and Rhythm: Normal rate. Rhythm irregular.      Heart sounds: Murmur heard.      No gallop.   Pulmonary:      Breath sounds: Normal breath sounds. No wheezing, rhonchi or rales.   Skin:     General: Skin is warm and dry.      Findings: No rash.   Neurological:      General: No focal deficit present.      Mental Status: She is alert and oriented to person, place, and time.   Psychiatric:         Mood and Affect: Mood normal.         Assessment/Plan  will continue regimen  Diagnoses and all orders for this visit:  Type 2 diabetes mellitus with other circulatory complication, without long-term current use of insulin (CMS/HCC)  -     POCT glycosylated hemoglobin (Hb A1C) manually resulted  Longstanding persistent atrial fibrillation (CMS/[facility])  Benign essential hypertension  Mixed hyperlipidemia  S/P TAVR (transcatheter aortic valve replacement)  Vitamin D deficiency  Gastroesophageal reflux disease without esophagitis  Nephropathy  Depression with anxiety  Idiopathic chronic gout of multiple sites without tophus  History of recurrent TIAs  Obstructive sleep apnea syndrome  Other orders  -     Follow Up In Primary Care - Medicare Annual; Future

## 2024-02-29 ENCOUNTER — ANTICOAGULATION - WARFARIN VISIT (OUTPATIENT)
Dept: CARDIOLOGY | Facility: CLINIC | Age: 79
End: 2024-02-29
Payer: MEDICARE

## 2024-02-29 DIAGNOSIS — I48.11 LONGSTANDING PERSISTENT ATRIAL FIBRILLATION (MULTI): Primary | ICD-10-CM

## 2024-02-29 LAB
POC INR: 2.3
POC PROTHROMBIN TIME: NORMAL

## 2024-02-29 PROCEDURE — 85610 PROTHROMBIN TIME: CPT | Mod: QW

## 2024-02-29 PROCEDURE — 99211 OFF/OP EST MAY X REQ PHY/QHP: CPT

## 2024-02-29 NOTE — PROGRESS NOTES
Patient identification verified with 2 identifiers.    Location: [facility] - [address]. [place], [place] [address] [phone]     Referring Physician: DR. CHEN  Enrollment/ Re-enrollment date: 24   INR Goal: 2.0-3.0  INR monitoring is per [facility] protocol.  Anticoagulation Medication: warfarin  Indication: Atrial Fibrillation/Atrial Flutter    Subjective   Bleeding signs/symptoms: No    Bruising: No   Major bleeding event: No  Thrombosis signs/symptoms: No  Thromboembolic event: No  Missed doses: No  Extra doses: No  Medication changes: No  Dietary changes: No  Change in health: No  Change in activity: No  Alcohol: No  Other concerns: No    Upcoming Procedures:  Does the Patient Have any upcoming procedures that require interruption in anticoagulation therapy? no  Does the patient require bridging? no      Anticoagulation Summary  As of 2024      INR goal:  2.0-3.0   TTR:  77.8 % (5 mo)   INR used for dosin.30 (2024)   Weekly warfarin total:  25 mg               Assessment/Plan   Therapeutic     1. New dose: no change  PT WILL NOTIFY [facility] IF SWITCHED TO [facility].   2. Next INR: 1 month      Education provided to patient during the visit:  Patient instructed to call in interim with questions, concerns and changes.   Patient educated on interactions between medications and warfarin.   Patient educated on dietary consistency in vitamin k consumption.   Patient educated on affects of alcohol consumption while taking warfarin.   Patient educated on signs of bleeding/clotting.   Patient educated on compliance with dosing, follow up appointments, and prescribed plan of care.

## 2024-03-28 ENCOUNTER — ANTICOAGULATION - WARFARIN VISIT (OUTPATIENT)
Dept: CARDIOLOGY | Facility: CLINIC | Age: 79
End: 2024-03-28
Payer: MEDICARE

## 2024-03-28 DIAGNOSIS — I48.91 ATRIAL FIBRILLATION, UNSPECIFIED TYPE (MULTI): Primary | ICD-10-CM

## 2024-03-28 LAB
POC INR: 2
POC PROTHROMBIN TIME: NORMAL

## 2024-03-28 PROCEDURE — 99211 OFF/OP EST MAY X REQ PHY/QHP: CPT

## 2024-03-28 PROCEDURE — 85610 PROTHROMBIN TIME: CPT | Mod: QW

## 2024-03-28 NOTE — PROGRESS NOTES
Patient identification verified with 2 identifiers.    Location: [facility] - [address]. [place], [place] [address] [phone]     Referring Physician: Dr Mandujano  Enrollment/ Re-enrollment date: 2024   INR Goal: 2.0-3.0  INR monitoring is per [facility] protocol.  Anticoagulation Medication: warfarin  Indication: Atrial Fibrillation/Atrial Flutter    Subjective   Bleeding signs/symptoms: No    Bruising: No   Major bleeding event: No  Thrombosis signs/symptoms: No  Thromboembolic event: No  Missed doses: No  Extra doses: No  Medication changes: No  Dietary changes: No  Change in health: No  Change in activity: No  Alcohol: No  Other concerns: No    Upcoming Procedures:  Does the Patient Have any upcoming procedures that require interruption in anticoagulation therapy? no  Does the patient require bridging? no      Anticoagulation Summary  As of 3/28/2024      INR goal:  2.0-3.0   TTR:  81.2 % (6 mo)   INR used for dosin.00 (3/28/2024)   Weekly warfarin total:  25 mg               Assessment/Plan   Therapeutic     1. New dose: no change    2. Next INR: 1 month      Education provided to patient during the visit:  Patient instructed to call in interim with questions, concerns and changes.   Patient educated on interactions between medications and warfarin.   Patient educated on dietary consistency in vitamin k consumption.   Patient educated on affects of alcohol consumption while taking warfarin.   Patient educated on signs of bleeding/clotting.   Patient educated on compliance with dosing, follow up appointments, and prescribed plan of care.

## 2024-04-13 DIAGNOSIS — E11.59 TYPE 2 DIABETES MELLITUS WITH OTHER CIRCULATORY COMPLICATION, WITHOUT LONG-TERM CURRENT USE OF INSULIN (MULTI): Primary | ICD-10-CM

## 2024-04-15 RX ORDER — METFORMIN HYDROCHLORIDE 500 MG/1
500 TABLET ORAL 2 TIMES DAILY
Qty: 180 TABLET | Refills: 3 | Status: SHIPPED | OUTPATIENT
Start: 2024-04-15

## 2024-04-25 ENCOUNTER — ANTICOAGULATION - WARFARIN VISIT (OUTPATIENT)
Dept: CARDIOLOGY | Facility: CLINIC | Age: 79
End: 2024-04-25
Payer: MEDICARE

## 2024-04-25 DIAGNOSIS — I48.91 ATRIAL FIBRILLATION, UNSPECIFIED TYPE (MULTI): Primary | ICD-10-CM

## 2024-04-25 LAB
POC INR: 1.7
POC PROTHROMBIN TIME: NORMAL

## 2024-04-25 PROCEDURE — 99211 OFF/OP EST MAY X REQ PHY/QHP: CPT

## 2024-04-25 PROCEDURE — 85610 PROTHROMBIN TIME: CPT | Mod: QW

## 2024-05-02 ENCOUNTER — ANTICOAGULATION - WARFARIN VISIT (OUTPATIENT)
Dept: CARDIOLOGY | Facility: CLINIC | Age: 79
End: 2024-05-02
Payer: MEDICARE

## 2024-05-02 DIAGNOSIS — I48.11 LONGSTANDING PERSISTENT ATRIAL FIBRILLATION (MULTI): Primary | ICD-10-CM

## 2024-05-02 LAB
POC INR: 2
POC PROTHROMBIN TIME: NORMAL

## 2024-05-02 PROCEDURE — 99211 OFF/OP EST MAY X REQ PHY/QHP: CPT

## 2024-05-02 PROCEDURE — 85610 PROTHROMBIN TIME: CPT | Mod: QW

## 2024-05-02 NOTE — PROGRESS NOTES
Patient identification verified with 2 identifiers.    Location: [facility] [address] [place], [place] [address] [phone]     Referring Physician: DR. CHEN  Enrollment/ Re-enrollment date: 24   INR Goal: 2.0-3.0  [facility]  Anticoagulation Medication: warfarin  Indication: Atrial Fibrillation/Atrial Flutter    Subjective   Bleeding signs/symptoms: No    Bruising: No   Major bleeding event: No  Thrombosis signs/symptoms: No  Thromboembolic event: No  Missed doses: No  Extra doses: No  Medication changes: No  Dietary changes: Yes  PT RESUMED CONSISTENT AMOUNT OF GREEN VEGETABLES.  Change in health: No  Change in activity: No  Alcohol: No  Other concerns: No    Upcoming Procedures:  Does the Patient Have any upcoming procedures that require interruption in anticoagulation therapy? no  Does the patient require bridging? no      Anticoagulation Summary  As of 2024      INR goal:  2.0-3.0   TTR:  68.0% (7.1 mo)   INR used for dosin.00 (2024)   Weekly warfarin total:  25 mg               Assessment/Plan   Therapeutic     1. New dose: no change    2. Next INR: 1 week      Education provided to patient during the visit:  Patient instructed to call in interim with questions, concerns and changes.   Patient educated on dietary consistency in vitamin k consumption.   Patient educated on signs of bleeding/clotting.   Patient educated on compliance with dosing, follow up appointments, and prescribed plan of care.

## 2024-05-09 ENCOUNTER — ANTICOAGULATION - WARFARIN VISIT (OUTPATIENT)
Dept: CARDIOLOGY | Facility: CLINIC | Age: 79
End: 2024-05-09
Payer: MEDICARE

## 2024-05-09 DIAGNOSIS — I48.11 LONGSTANDING PERSISTENT ATRIAL FIBRILLATION (MULTI): Primary | ICD-10-CM

## 2024-05-09 LAB
POC INR: 2.3
POC PROTHROMBIN TIME: NORMAL

## 2024-05-09 PROCEDURE — 85610 PROTHROMBIN TIME: CPT | Mod: QW

## 2024-05-09 PROCEDURE — 99211 OFF/OP EST MAY X REQ PHY/QHP: CPT

## 2024-05-09 NOTE — PROGRESS NOTES
Patient identification verified with 2 identifiers.    Location: [facility] [address] [place], [place] [address] [phone]     Referring Physician: DR. CHEN  Enrollment/ Re-enrollment date: 24   INR Goal: 2.0-3.0  INR monitoring is per [facility] protocol.  Anticoagulation Medication: warfarin  Indication: Atrial Fibrillation/Atrial Flutter    Subjective   Bleeding signs/symptoms: No    Bruising: No   Major bleeding event: No  Thrombosis signs/symptoms: No  Thromboembolic event: No  Missed doses: No  Extra doses: No  Medication changes: No  Dietary changes: No  Change in health: No  Change in activity: No  Alcohol: No  Other concerns: No    Upcoming Procedures:  Does the Patient Have any upcoming procedures that require interruption in anticoagulation therapy? no  Does the patient require bridging? no      Anticoagulation Summary  As of 2024      INR goal:  2.0-3.0   TTR:  69.0% (7.4 mo)   INR used for dosin.30 (2024)   Weekly warfarin total:  25 mg               Assessment/Plan   Therapeutic     1. New dose: no change    2. Next INR: 2 weeks      Education provided to patient during the visit:  Patient instructed to call in interim with questions, concerns and changes.   Patient educated on signs of bleeding/clotting.

## 2024-05-23 ENCOUNTER — TELEPHONE (OUTPATIENT)
Dept: CARDIOLOGY | Facility: CLINIC | Age: 79
End: 2024-05-23
Payer: MEDICARE

## 2024-05-23 NOTE — TELEPHONE ENCOUNTER
Pt l/m around 1200.  She states that she received message I left regarding missed appt.   I called her back, left message offering to see her today at 2pm.  She never rtc.  I called again and left message to call back to reschedule.

## 2024-05-24 ENCOUNTER — ANTICOAGULATION - WARFARIN VISIT (OUTPATIENT)
Dept: CARDIOLOGY | Facility: CLINIC | Age: 79
End: 2024-05-24
Payer: MEDICARE

## 2024-05-24 DIAGNOSIS — I48.91 ATRIAL FIBRILLATION, UNSPECIFIED TYPE (MULTI): Primary | ICD-10-CM

## 2024-05-24 NOTE — TELEPHONE ENCOUNTER
Patient returned call to [facility].  Scheduled patient return to clinic Tuesday 5/28/24 at 8:30 am.

## 2024-05-28 ENCOUNTER — ANTICOAGULATION - WARFARIN VISIT (OUTPATIENT)
Dept: CARDIOLOGY | Facility: CLINIC | Age: 79
End: 2024-05-28
Payer: MEDICARE

## 2024-05-28 DIAGNOSIS — I48.11 LONGSTANDING PERSISTENT ATRIAL FIBRILLATION (MULTI): ICD-10-CM

## 2024-05-28 LAB
POC INR: 1.9
POC PROTHROMBIN TIME: NORMAL

## 2024-05-28 PROCEDURE — 99211 OFF/OP EST MAY X REQ PHY/QHP: CPT

## 2024-05-28 PROCEDURE — 85610 PROTHROMBIN TIME: CPT | Mod: QW

## 2024-05-28 NOTE — PROGRESS NOTES
Patient identification verified with 2 identifiers.    Location: [facility] - [address] [place], [place] [address] [phone]     Referring Physician: Dr Mandujano  Enrollment/ Re-enrollment date: 2024   INR Goal: 2.0-3.0  INR monitoring is per [facility] protocol.  Anticoagulation Medication: warfarin  Indication: Atrial Fibrillation/Atrial Flutter    Subjective   Bleeding signs/symptoms: No    Bruising: No   Major bleeding event: No  Thrombosis signs/symptoms: No  Thromboembolic event: No  Missed doses: No  Extra doses: No  Medication changes: No  Dietary changes: No  Change in health: No  Change in activity: No  Alcohol: No  Other concerns: No    Upcoming Procedures:  Does the Patient Have any upcoming procedures that require interruption in anticoagulation therapy? no  Does the patient require bridging? no      Anticoagulation Summary  As of 2024      INR goal:  2.0-3.0   TTR:  69.5% (8 mo)   INR used for dosin.90 (2024)   Weekly warfarin total:  25 mg               Assessment/Plan   Subtherapeutic     1. New dose:  increase weekly dose     2. Next INR:  9 days      Education provided to patient during the visit:  Patient instructed to call in interim with questions, concerns and changes.   Patient educated on interactions between medications and warfarin.   Patient educated on dietary consistency in vitamin k consumption.   Patient educated on affects of alcohol consumption while taking warfarin.   Patient educated on signs of bleeding/clotting.   Patient educated on compliance with dosing, follow up appointments, and prescribed plan of care.

## 2024-05-31 ENCOUNTER — OFFICE VISIT (OUTPATIENT)
Dept: PRIMARY CARE | Facility: CLINIC | Age: 79
End: 2024-05-31
Payer: MEDICARE

## 2024-05-31 VITALS
DIASTOLIC BLOOD PRESSURE: 60 MMHG | OXYGEN SATURATION: 99 % | WEIGHT: 222.2 LBS | BODY MASS INDEX: 36.98 KG/M2 | HEART RATE: 96 BPM | SYSTOLIC BLOOD PRESSURE: 134 MMHG

## 2024-05-31 DIAGNOSIS — I48.11 LONGSTANDING PERSISTENT ATRIAL FIBRILLATION (MULTI): ICD-10-CM

## 2024-05-31 DIAGNOSIS — N28.9 NEPHROPATHY: ICD-10-CM

## 2024-05-31 DIAGNOSIS — E55.9 VITAMIN D DEFICIENCY: ICD-10-CM

## 2024-05-31 DIAGNOSIS — F41.8 DEPRESSION WITH ANXIETY: ICD-10-CM

## 2024-05-31 DIAGNOSIS — M1A.09X0 IDIOPATHIC CHRONIC GOUT OF MULTIPLE SITES WITHOUT TOPHUS: ICD-10-CM

## 2024-05-31 DIAGNOSIS — E78.2 MIXED HYPERLIPIDEMIA: ICD-10-CM

## 2024-05-31 DIAGNOSIS — G47.33 OBSTRUCTIVE SLEEP APNEA SYNDROME: ICD-10-CM

## 2024-05-31 DIAGNOSIS — M17.10 ARTHRITIS OF KNEE: ICD-10-CM

## 2024-05-31 DIAGNOSIS — Z95.2 S/P TAVR (TRANSCATHETER AORTIC VALVE REPLACEMENT): ICD-10-CM

## 2024-05-31 DIAGNOSIS — Z00.00 MEDICARE ANNUAL WELLNESS VISIT, SUBSEQUENT: Primary | ICD-10-CM

## 2024-05-31 DIAGNOSIS — E11.59 TYPE 2 DIABETES MELLITUS WITH OTHER CIRCULATORY COMPLICATION, WITHOUT LONG-TERM CURRENT USE OF INSULIN (MULTI): ICD-10-CM

## 2024-05-31 DIAGNOSIS — K21.9 GASTROESOPHAGEAL REFLUX DISEASE WITHOUT ESOPHAGITIS: ICD-10-CM

## 2024-05-31 DIAGNOSIS — I10 BENIGN ESSENTIAL HYPERTENSION: ICD-10-CM

## 2024-05-31 DIAGNOSIS — Z86.73 HISTORY OF RECURRENT TIAS: ICD-10-CM

## 2024-05-31 LAB — POC HEMOGLOBIN A1C: 8.1 % (ref 4.2–6.5)

## 2024-05-31 PROCEDURE — 99215 OFFICE O/P EST HI 40 MIN: CPT | Performed by: INTERNAL MEDICINE

## 2024-05-31 PROCEDURE — 1159F MED LIST DOCD IN RCRD: CPT | Performed by: INTERNAL MEDICINE

## 2024-05-31 PROCEDURE — G0439 PPPS, SUBSEQ VISIT: HCPCS | Performed by: INTERNAL MEDICINE

## 2024-05-31 PROCEDURE — 1157F ADVNC CARE PLAN IN RCRD: CPT | Performed by: INTERNAL MEDICINE

## 2024-05-31 PROCEDURE — 90714 TD VACC NO PRESV 7 YRS+ IM: CPT | Performed by: INTERNAL MEDICINE

## 2024-05-31 PROCEDURE — 1158F ADVNC CARE PLAN TLK DOCD: CPT | Performed by: INTERNAL MEDICINE

## 2024-05-31 PROCEDURE — 1036F TOBACCO NON-USER: CPT | Performed by: INTERNAL MEDICINE

## 2024-05-31 PROCEDURE — 83036 HEMOGLOBIN GLYCOSYLATED A1C: CPT | Mod: MUE | Performed by: INTERNAL MEDICINE

## 2024-05-31 PROCEDURE — 1126F AMNT PAIN NOTED NONE PRSNT: CPT | Performed by: INTERNAL MEDICINE

## 2024-05-31 PROCEDURE — 3078F DIAST BP <80 MM HG: CPT | Performed by: INTERNAL MEDICINE

## 2024-05-31 PROCEDURE — 3075F SYST BP GE 130 - 139MM HG: CPT | Performed by: INTERNAL MEDICINE

## 2024-05-31 PROCEDURE — 1123F ACP DISCUSS/DSCN MKR DOCD: CPT | Performed by: INTERNAL MEDICINE

## 2024-05-31 RX ORDER — ALPRAZOLAM 0.25 MG/1
0.25 TABLET ORAL 3 TIMES DAILY PRN
Qty: 60 TABLET | Refills: 3 | Status: SHIPPED | OUTPATIENT
Start: 2024-05-31

## 2024-05-31 ASSESSMENT — PAIN SCALES - GENERAL: PAINLEVEL: 0-NO PAIN

## 2024-05-31 ASSESSMENT — ENCOUNTER SYMPTOMS
DYSURIA: 0
OCCASIONAL FEELINGS OF UNSTEADINESS: 0
SHORTNESS OF BREATH: 0
ABDOMINAL PAIN: 0
COUGH: 0
DIZZINESS: 0
CARDIOVASCULAR NEGATIVE: 1
CONSTIPATION: 0
DEPRESSION: 0
DIARRHEA: 0
POLYPHAGIA: 0
LOSS OF SENSATION IN FEET: 0
CONSTITUTIONAL NEGATIVE: 1
ARTHRALGIAS: 1
WEAKNESS: 1
PSYCHIATRIC NEGATIVE: 1
ACTIVITY CHANGE: 0

## 2024-05-31 ASSESSMENT — PATIENT HEALTH QUESTIONNAIRE - PHQ9
2. FEELING DOWN, DEPRESSED OR HOPELESS: NOT AT ALL
1. LITTLE INTEREST OR PLEASURE IN DOING THINGS: NOT AT ALL
SUM OF ALL RESPONSES TO PHQ9 QUESTIONS 1 AND 2: 0

## 2024-05-31 NOTE — PROGRESS NOTES
"Subjective   Reason for Visit: Kyleigh Chappell is an 78 y.o. female here for a Medicare Wellness visit.     Past Medical, Surgical, and Family History reviewed and updated in chart.    Reviewed all medications by prescribing practitioner or clinical pharmacist (such as prescriptions, OTCs, herbal therapies and supplements) and documented in the medical record.    Atrial Fibrillation--stable on meds-remains on coumadin-adjusted by cardio. Managed by  Dr Mandujano    S/p aortic valve repair-monitored by Dr Mandujano    NIDDM 2--stable on meds.  Sugars at home:  140-150 x/ when on steroids .  Lab Results       Component                Value               Date                       HGBA1C                   8.8 (A)             11/20/2023                                             8.2                    2/24/2024                                             8.1                   5/31/2024        GERD-stable on  protonix    Gout--last uric acid 5.6 . On allopurinol. Last flare after lobster    Depression with anxiety---fairly stable w/ sertraline and rarely needs xanax; stressed with sedentary     CRF-2/2024  30 eGFR    Sleep apnea-uses cpap nightly    H/o recurrant TIA's-no symptoms since on coumadin    Exercise started at rec center-water exercises, floor exercises       Diet tries to watch carbs    Dr Mandujano-no changes; due to see Dr Lamas next month    Declines mammo or DEXA- \"why at 79?\"; confirmed advanced directives; declines shingles shot            Patient Care Team:  Danielle Quiroz MD as PCP - General (Internal Medicine)     Review of Systems   Constitutional: Negative.  Negative for activity change.        Slowly losing weight   HENT:  Negative for hearing loss.    Eyes:  Negative for visual disturbance.   Respiratory:  Negative for cough and shortness of breath.    Cardiovascular: Negative.         Able to exercise without difficulty   Gastrointestinal:  Negative for abdominal pain, constipation " and diarrhea.   Endocrine: Negative for polyphagia and polyuria.   Genitourinary:  Negative for dysuria.   Musculoskeletal:  Positive for arthralgias.        Using voltaren gel when has pain   Skin:  Negative for rash.   Neurological:  Positive for weakness (legs already stronger with exercise). Negative for dizziness.   Psychiatric/Behavioral: Negative.          Making new friends at exercise; stressed with        Objective   Vitals:  /60 (BP Location: Left arm, Patient Position: Sitting)   Pulse 96   Wt 101 kg (222 lb 3.2 oz)   SpO2 99%   BMI 36.98 kg/m²       Physical Exam  Constitutional:       General: She is not in acute distress.     Appearance: Normal appearance.   Cardiovascular:      Rate and Rhythm: Normal rate. Rhythm irregular.      Heart sounds: Murmur heard.      No gallop.   Pulmonary:      Breath sounds: Normal breath sounds. No wheezing, rhonchi or rales.   Chest:      Comments: Declines breast exam  Skin:     General: Skin is warm and dry.      Findings: No rash.   Neurological:      General: No focal deficit present.      Mental Status: She is alert and oriented to person, place, and time.      Cranial Nerves: Cranial nerves 2-12 are intact.      Sensory: Sensory deficit (absent vibration R foot and shin, diminished on left; light touch intact bilat feet) present.      Motor: Weakness (4/5 strength bilat LE) present.      Coordination: Coordination is intact.      Gait: Gait is intact.   Psychiatric:         Mood and Affect: Mood normal.         Cognition and Memory: Cognition and memory normal.      Comments: Frustrated with          Assessment/Plan  declines mammo and DEXA; will continue regimen, order labs  Problem List Items Addressed This Visit       Arthritis of knee    Atrial fibrillation (Multi)    Benign essential hypertension    Depression with anxiety    Relevant Medications    ALPRAZolam (Xanax) 0.25 mg tablet    GERD (gastroesophageal reflux disease)    Gout     Relevant Orders    Uric Acid    History of recurrent TIAs    Hyperlipidemia    Relevant Orders    Lipid Panel    AST    ALT    Nephropathy    Sleep apnea    Vitamin D deficiency    Type 2 diabetes mellitus with circulatory disorder (Multi)    Relevant Orders    POCT glycosylated hemoglobin (Hb A1C) manually resulted (Completed)    S/P TAVR (transcatheter aortic valve replacement)     Other Visit Diagnoses       Medicare annual wellness visit, subsequent    -  Primary

## 2024-06-06 ENCOUNTER — ANTICOAGULATION - WARFARIN VISIT (OUTPATIENT)
Dept: CARDIOLOGY | Facility: CLINIC | Age: 79
End: 2024-06-06
Payer: MEDICARE

## 2024-06-06 DIAGNOSIS — I48.11 LONGSTANDING PERSISTENT ATRIAL FIBRILLATION (MULTI): Primary | ICD-10-CM

## 2024-06-06 LAB
POC INR: 2.1
POC PROTHROMBIN TIME: NORMAL

## 2024-06-06 PROCEDURE — 85610 PROTHROMBIN TIME: CPT | Mod: QW

## 2024-06-06 PROCEDURE — 99211 OFF/OP EST MAY X REQ PHY/QHP: CPT

## 2024-06-06 NOTE — PROGRESS NOTES
Patient identification verified with 2 identifiers.    Location: [facility] [address] [place], [place] [address] [phone]     Referring Physician: DR. CHEN  Enrollment/ Re-enrollment date: 24   INR Goal: 2.0-3.0  INR monitoring is per [facility] protocol.  Anticoagulation Medication: warfarin  Indication: Atrial Fibrillation/Atrial Flutter    Subjective   Bleeding signs/symptoms: No    Bruising: No   Major bleeding event: No  Thrombosis signs/symptoms: No  Thromboembolic event: No  Missed doses: No  Extra doses: No  Medication changes: No  Dietary changes: No  Change in health: No  Change in activity: No  Alcohol: No  Other concerns: No    Upcoming Procedures:  Does the Patient Have any upcoming procedures that require interruption in anticoagulation therapy? no  Does the patient require bridging? no      Anticoagulation Summary  As of 2024      INR goal:  2.0-3.0   TTR:  68.8% (8.3 mo)   INR used for dosin.10 (2024)   Weekly warfarin total:  27.5 mg               Assessment/Plan   Therapeutic     1. New dose: no change    2. Next INR: 2 weeks      Education provided to patient during the visit:  Patient instructed to call in interim with questions, concerns and changes.   Patient educated on dietary consistency in vitamin k consumption.

## 2024-06-14 DIAGNOSIS — I10 BENIGN ESSENTIAL HYPERTENSION: Primary | ICD-10-CM

## 2024-06-14 RX ORDER — METOPROLOL TARTRATE 50 MG/1
50 TABLET ORAL 2 TIMES DAILY
Qty: 180 TABLET | Refills: 3 | Status: SHIPPED | OUTPATIENT
Start: 2024-06-14

## 2024-06-19 ENCOUNTER — LAB (OUTPATIENT)
Dept: LAB | Facility: LAB | Age: 79
End: 2024-06-19
Payer: MEDICARE

## 2024-06-19 DIAGNOSIS — E78.49 OTHER HYPERLIPIDEMIA: ICD-10-CM

## 2024-06-19 DIAGNOSIS — R80.0 ISOLATED PROTEINURIA: ICD-10-CM

## 2024-06-19 DIAGNOSIS — N18.30 CHRONIC KIDNEY DISEASE, STAGE 3 UNSPECIFIED (MULTI): Primary | ICD-10-CM

## 2024-06-19 DIAGNOSIS — I10 ESSENTIAL (PRIMARY) HYPERTENSION: ICD-10-CM

## 2024-06-19 DIAGNOSIS — E11.21 TYPE 2 DIABETES MELLITUS WITH DIABETIC NEPHROPATHY (MULTI): ICD-10-CM

## 2024-06-19 DIAGNOSIS — E78.2 MIXED HYPERLIPIDEMIA: ICD-10-CM

## 2024-06-19 DIAGNOSIS — M10.00 IDIOPATHIC GOUT, UNSPECIFIED SITE: ICD-10-CM

## 2024-06-19 DIAGNOSIS — E55.9 VITAMIN D DEFICIENCY, UNSPECIFIED: ICD-10-CM

## 2024-06-19 DIAGNOSIS — N18.30 CHRONIC KIDNEY DISEASE, STAGE 3 UNSPECIFIED (MULTI): ICD-10-CM

## 2024-06-19 DIAGNOSIS — M1A.09X0 IDIOPATHIC CHRONIC GOUT OF MULTIPLE SITES WITHOUT TOPHUS: ICD-10-CM

## 2024-06-19 LAB
25(OH)D3 SERPL-MCNC: 61 NG/ML (ref 30–100)
ALBUMIN SERPL BCP-MCNC: 4 G/DL (ref 3.4–5)
ALT SERPL W P-5'-P-CCNC: 9 U/L (ref 7–45)
ANION GAP SERPL CALC-SCNC: 13 MMOL/L (ref 10–20)
AST SERPL W P-5'-P-CCNC: 12 U/L (ref 9–39)
BASOPHILS # BLD AUTO: 0.04 X10*3/UL (ref 0–0.1)
BASOPHILS NFR BLD AUTO: 0.7 %
BUN SERPL-MCNC: 36 MG/DL (ref 6–23)
CALCIUM SERPL-MCNC: 8.9 MG/DL (ref 8.6–10.6)
CHLORIDE SERPL-SCNC: 103 MMOL/L (ref 98–107)
CHOLEST SERPL-MCNC: 185 MG/DL (ref 0–199)
CHOLESTEROL/HDL RATIO: 4.8
CO2 SERPL-SCNC: 27 MMOL/L (ref 21–32)
CREAT SERPL-MCNC: 1.96 MG/DL (ref 0.5–1.05)
EGFRCR SERPLBLD CKD-EPI 2021: 26 ML/MIN/1.73M*2
EOSINOPHIL # BLD AUTO: 0.21 X10*3/UL (ref 0–0.4)
EOSINOPHIL NFR BLD AUTO: 3.8 %
ERYTHROCYTE [DISTWIDTH] IN BLOOD BY AUTOMATED COUNT: 17.5 % (ref 11.5–14.5)
FERRITIN SERPL-MCNC: 17 NG/ML (ref 8–150)
GLUCOSE SERPL-MCNC: 152 MG/DL (ref 74–99)
HCT VFR BLD AUTO: 33.6 % (ref 36–46)
HDLC SERPL-MCNC: 38.4 MG/DL
HGB BLD-MCNC: 9.4 G/DL (ref 12–16)
IMM GRANULOCYTES # BLD AUTO: 0.02 X10*3/UL (ref 0–0.5)
IMM GRANULOCYTES NFR BLD AUTO: 0.4 % (ref 0–0.9)
IRON SATN MFR SERPL: 4 % (ref 25–45)
IRON SERPL-MCNC: 17 UG/DL (ref 35–150)
LDLC SERPL CALC-MCNC: 114 MG/DL
LYMPHOCYTES # BLD AUTO: 1.57 X10*3/UL (ref 0.8–3)
LYMPHOCYTES NFR BLD AUTO: 28 %
MCH RBC QN AUTO: 21.3 PG (ref 26–34)
MCHC RBC AUTO-ENTMCNC: 28 G/DL (ref 32–36)
MCV RBC AUTO: 76 FL (ref 80–100)
MONOCYTES # BLD AUTO: 0.45 X10*3/UL (ref 0.05–0.8)
MONOCYTES NFR BLD AUTO: 8 %
NEUTROPHILS # BLD AUTO: 3.31 X10*3/UL (ref 1.6–5.5)
NEUTROPHILS NFR BLD AUTO: 59.1 %
NON HDL CHOLESTEROL: 147 MG/DL (ref 0–149)
NRBC BLD-RTO: 0 /100 WBCS (ref 0–0)
PHOSPHATE SERPL-MCNC: 4.9 MG/DL (ref 2.5–4.9)
PLATELET # BLD AUTO: 317 X10*3/UL (ref 150–450)
POTASSIUM SERPL-SCNC: 4.9 MMOL/L (ref 3.5–5.3)
PTH-INTACT SERPL-MCNC: 57.4 PG/ML (ref 18.5–88)
RBC # BLD AUTO: 4.42 X10*6/UL (ref 4–5.2)
SODIUM SERPL-SCNC: 138 MMOL/L (ref 136–145)
TIBC SERPL-MCNC: 439 UG/DL (ref 240–445)
TRIGL SERPL-MCNC: 165 MG/DL (ref 0–149)
UIBC SERPL-MCNC: 422 UG/DL (ref 110–370)
URATE SERPL-MCNC: 4.1 MG/DL (ref 2.3–6.7)
VLDL: 33 MG/DL (ref 0–40)
WBC # BLD AUTO: 5.6 X10*3/UL (ref 4.4–11.3)

## 2024-06-19 PROCEDURE — 80061 LIPID PANEL: CPT

## 2024-06-19 PROCEDURE — 83540 ASSAY OF IRON: CPT

## 2024-06-19 PROCEDURE — 36415 COLL VENOUS BLD VENIPUNCTURE: CPT

## 2024-06-19 PROCEDURE — 82728 ASSAY OF FERRITIN: CPT

## 2024-06-19 PROCEDURE — 84460 ALANINE AMINO (ALT) (SGPT): CPT

## 2024-06-19 PROCEDURE — 84450 TRANSFERASE (AST) (SGOT): CPT

## 2024-06-19 PROCEDURE — 83550 IRON BINDING TEST: CPT

## 2024-06-19 PROCEDURE — 84550 ASSAY OF BLOOD/URIC ACID: CPT

## 2024-06-19 PROCEDURE — 83970 ASSAY OF PARATHORMONE: CPT

## 2024-06-19 PROCEDURE — 82306 VITAMIN D 25 HYDROXY: CPT

## 2024-06-19 PROCEDURE — 80069 RENAL FUNCTION PANEL: CPT

## 2024-06-19 PROCEDURE — 85025 COMPLETE CBC W/AUTO DIFF WBC: CPT

## 2024-06-20 ENCOUNTER — APPOINTMENT (OUTPATIENT)
Dept: LAB | Facility: LAB | Age: 79
End: 2024-06-20
Payer: MEDICARE

## 2024-06-20 ENCOUNTER — ANTICOAGULATION - WARFARIN VISIT (OUTPATIENT)
Dept: CARDIOLOGY | Facility: CLINIC | Age: 79
End: 2024-06-20
Payer: MEDICARE

## 2024-06-20 DIAGNOSIS — I48.11 LONGSTANDING PERSISTENT ATRIAL FIBRILLATION (MULTI): Primary | ICD-10-CM

## 2024-06-20 LAB
APPEARANCE UR: CLEAR
BILIRUB UR STRIP.AUTO-MCNC: NEGATIVE MG/DL
COLOR UR: COLORLESS
CREAT UR-MCNC: 37.8 MG/DL (ref 20–320)
CREAT UR-MCNC: 37.8 MG/DL (ref 20–320)
GLUCOSE UR STRIP.AUTO-MCNC: ABNORMAL MG/DL
KETONES UR STRIP.AUTO-MCNC: NEGATIVE MG/DL
LEUKOCYTE ESTERASE UR QL STRIP.AUTO: ABNORMAL
MICROALBUMIN UR-MCNC: 8.2 MG/L
MICROALBUMIN/CREAT UR: 21.7 UG/MG CREAT
NITRITE UR QL STRIP.AUTO: NEGATIVE
PH UR STRIP.AUTO: 5 [PH]
POC INR: 2.5
POC PROTHROMBIN TIME: NORMAL
PROT UR STRIP.AUTO-MCNC: NEGATIVE MG/DL
PROT UR-ACNC: 6 MG/DL (ref 5–24)
PROT/CREAT UR: 0.16 MG/MG CREAT (ref 0–0.17)
RBC # UR STRIP.AUTO: NEGATIVE /UL
RBC #/AREA URNS AUTO: ABNORMAL /HPF
SP GR UR STRIP.AUTO: 1.01
SQUAMOUS #/AREA URNS AUTO: ABNORMAL /HPF
UROBILINOGEN UR STRIP.AUTO-MCNC: NORMAL MG/DL
WBC #/AREA URNS AUTO: ABNORMAL /HPF

## 2024-06-20 PROCEDURE — 82570 ASSAY OF URINE CREATININE: CPT

## 2024-06-20 PROCEDURE — 99211 OFF/OP EST MAY X REQ PHY/QHP: CPT

## 2024-06-20 PROCEDURE — 81001 URINALYSIS AUTO W/SCOPE: CPT

## 2024-06-20 PROCEDURE — 82043 UR ALBUMIN QUANTITATIVE: CPT

## 2024-06-20 PROCEDURE — 84156 ASSAY OF PROTEIN URINE: CPT

## 2024-06-20 PROCEDURE — 85610 PROTHROMBIN TIME: CPT | Mod: QW

## 2024-06-20 NOTE — PROGRESS NOTES
Patient identification verified with 2 identifiers.    Location: [facility] - [address]. [place], [place] [address] [phone]     Referring Physician: DR CHEN  Enrollment/ Re-enrollment date: 24   INR Goal: 2.0-3.0  INR monitoring is per [facility] protocol.  Anticoagulation Medication: warfarin  Indication: Atrial Fibrillation/Atrial Flutter    Subjective   Bleeding signs/symptoms: No    Bruising: No   Major bleeding event: No  Thrombosis signs/symptoms: No  Thromboembolic event: No  Missed doses: No  Extra doses: No  Medication changes: No  Dietary changes: No  Change in health: No  Change in activity: No  Alcohol: No  Other concerns: No    Upcoming Procedures:  Does the Patient Have any upcoming procedures that require interruption in anticoagulation therapy? no  Does the patient require bridging? no      Anticoagulation Summary  As of 2024      INR goal:  2.0-3.0   TTR:  70.4% (8.8 mo)   INR used for dosin.50 (2024)   Weekly warfarin total:  27.5 mg               Assessment/Plan   Therapeutic     1. New dose: no change    2. Next INR: 2 weeks      Education provided to patient during the visit:  Patient instructed to call in interim with questions, concerns and changes.

## 2024-06-27 ENCOUNTER — TELEPHONE (OUTPATIENT)
Dept: CARDIOLOGY | Facility: CLINIC | Age: 79
End: 2024-06-27

## 2024-06-27 NOTE — TELEPHONE ENCOUNTER
Returning call for new medication. Describes starting on Fe Polysacharide. Has been on Iron before with no INR changes.   Plan to stay on current dosing schedule and redraw 7/5 Fri

## 2024-07-05 ENCOUNTER — ANTICOAGULATION - WARFARIN VISIT (OUTPATIENT)
Dept: CARDIOLOGY | Facility: CLINIC | Age: 79
End: 2024-07-05
Payer: MEDICARE

## 2024-07-05 DIAGNOSIS — I48.91 ATRIAL FIBRILLATION, UNSPECIFIED TYPE (MULTI): Primary | ICD-10-CM

## 2024-07-05 LAB
POC INR: 2.7
POC PROTHROMBIN TIME: NORMAL

## 2024-07-05 PROCEDURE — 85610 PROTHROMBIN TIME: CPT | Mod: QW

## 2024-07-05 PROCEDURE — 99211 OFF/OP EST MAY X REQ PHY/QHP: CPT

## 2024-07-05 NOTE — PROGRESS NOTES
Patient identification verified with 2 identifiers.    Location: [facility] - [address]. [place], [place] [address] [phone]     Referring Physician: Dr Mandujano  Enrollment/ Re-enrollment date: 2024   INR Goal: 2.0-3.0  INR monitoring is per [facility] protocol.  Anticoagulation Medication: warfarin  Indication: Atrial Fibrillation/Atrial Flutter    Subjective   Bleeding signs/symptoms: No    Bruising: No   Major bleeding event: No  Thrombosis signs/symptoms: No  Thromboembolic event: No  Missed doses: No  Extra doses: No  Medication changes: No  Dietary changes: No  Change in health: No  Change in activity: No  Alcohol: No  Other concerns: No  Patient continues on iron supplement once daily, has been taking greater than one week.      Upcoming Procedures:  Does the Patient Have any upcoming procedures that require interruption in anticoagulation therapy? no  Does the patient require bridging? no      Anticoagulation Summary  As of 2024      INR goal:  2.0-3.0   TTR:  72.0% (9.3 mo)   INR used for dosin.70 (2024)   Weekly warfarin total:  27.5 mg               Assessment/Plan   Therapeutic     1. New dose: no change    2. Next INR: 1 month      Education provided to patient during the visit:  Patient instructed to call in interim with questions, concerns and changes.   Patient educated on interactions between medications and warfarin.   Patient educated on dietary consistency in vitamin k consumption.   Patient educated on affects of alcohol consumption while taking warfarin.   Patient educated on signs of bleeding/clotting.   Patient educated on compliance with dosing, follow up appointments, and prescribed plan of care.

## 2024-07-25 DIAGNOSIS — K21.9 GASTROESOPHAGEAL REFLUX DISEASE WITHOUT ESOPHAGITIS: Primary | ICD-10-CM

## 2024-07-25 RX ORDER — PANTOPRAZOLE SODIUM 40 MG/1
40 TABLET, DELAYED RELEASE ORAL 2 TIMES DAILY
Qty: 180 TABLET | Refills: 3 | Status: SHIPPED | OUTPATIENT
Start: 2024-07-25

## 2024-08-01 ENCOUNTER — ANTICOAGULATION - WARFARIN VISIT (OUTPATIENT)
Dept: CARDIOLOGY | Facility: CLINIC | Age: 79
End: 2024-08-01
Payer: MEDICARE

## 2024-08-01 DIAGNOSIS — I48.11 LONGSTANDING PERSISTENT ATRIAL FIBRILLATION (MULTI): Primary | ICD-10-CM

## 2024-08-01 LAB
POC INR: 1.9
POC PROTHROMBIN TIME: NORMAL

## 2024-08-01 PROCEDURE — 85610 PROTHROMBIN TIME: CPT | Mod: QW

## 2024-08-01 PROCEDURE — 99211 OFF/OP EST MAY X REQ PHY/QHP: CPT

## 2024-08-01 NOTE — PROGRESS NOTES
Patient identification verified with 2 identifiers.    Location: [facility] - [address] [place], [place] [address] [phone]     Referring Physician: Dr Mandujano  Enrollment/ Re-enrollment date: 2024   INR Goal: 2.0-3.0  INR monitoring is per [facility] protocol.  Anticoagulation Medication: warfarin  Indication: Atrial Fibrillation/Atrial Flutter    Subjective   Bleeding signs/symptoms: No    Bruising: No   Major bleeding event: No  Thrombosis signs/symptoms: No  Thromboembolic event: No  Missed doses: No  Extra doses: No  Medication changes: No  Dietary changes: Yes  Change in health: No  Change in activity: No  Alcohol: No  Other concerns: No  Patient continues on iron supplement once daily, has been taking greater than one week.      Upcoming Procedures:  Does the Patient Have any upcoming procedures that require interruption in anticoagulation therapy? no  Does the patient require bridging? no      Anticoagulation Summary  As of 2024      INR goal:  2.0-3.0   TTR:  73.4% (10.2 mo)   INR used for dosin.90 (2024)   Weekly warfarin total:  27.5 mg               Assessment/Plan   subTherapeutic     1. New dose: no change    2. Next INR: 1 week      Education provided to patient during the visit:  Patient instructed to call in interim with questions, concerns and changes.   Patient educated on interactions between medications and warfarin.   Patient educated on dietary consistency in vitamin k consumption.   Patient educated on affects of alcohol consumption while taking warfarin.   Patient educated on signs of bleeding/clotting.   Patient educated on compliance with dosing, follow up appointments, and prescribed plan of care.

## 2024-08-03 DIAGNOSIS — E11.59 TYPE 2 DIABETES MELLITUS WITH OTHER CIRCULATORY COMPLICATION, WITHOUT LONG-TERM CURRENT USE OF INSULIN (MULTI): Primary | ICD-10-CM

## 2024-08-05 RX ORDER — BLOOD-GLUCOSE METER
EACH MISCELLANEOUS
Qty: 100 EACH | Refills: 3 | Status: SHIPPED | OUTPATIENT
Start: 2024-08-05

## 2024-08-08 ENCOUNTER — APPOINTMENT (OUTPATIENT)
Dept: CARDIOLOGY | Facility: CLINIC | Age: 79
End: 2024-08-08
Payer: MEDICARE

## 2024-08-08 DIAGNOSIS — I48.91 ATRIAL FIBRILLATION, UNSPECIFIED TYPE (MULTI): Primary | ICD-10-CM

## 2024-08-08 DIAGNOSIS — F41.8 DEPRESSION WITH ANXIETY: ICD-10-CM

## 2024-08-08 DIAGNOSIS — I48.11 LONGSTANDING PERSISTENT ATRIAL FIBRILLATION (MULTI): Primary | ICD-10-CM

## 2024-08-08 LAB
POC INR: 2.1
POC PROTHROMBIN TIME: NORMAL

## 2024-08-08 PROCEDURE — 85610 PROTHROMBIN TIME: CPT | Mod: QW

## 2024-08-08 PROCEDURE — 99211 OFF/OP EST MAY X REQ PHY/QHP: CPT

## 2024-08-08 NOTE — PROGRESS NOTES
Patient identification verified with 2 identifiers.    Location: [facility] - [address]. [place], [place] [address] [phone]     Referring Physician: DR. CHEN  Enrollment/ Re-enrollment date: 24 RENEWAL REQUEST ESENT ON 24   INR Goal: 2.0-3.0  INR monitoring is per [facility] protocol.  Anticoagulation Medication: warfarin  Indication: Atrial Fibrillation/Atrial Flutter    Subjective   Bleeding signs/symptoms: No    Bruising: No   Major bleeding event: No  Thrombosis signs/symptoms: No  Thromboembolic event: No  Missed doses: No  Extra doses: No  Medication changes: No  Dietary changes: Yes  PT HAS BEEN MAKING FRUIT/VEGETABLE SMOOTHIES ON OCCASION.  Change in health: No  Change in activity: No  Alcohol: No  Other concerns: No    Upcoming Procedures:  Does the Patient Have any upcoming procedures that require interruption in anticoagulation therapy? no  Does the patient require bridging? no      Anticoagulation Summary  As of 2024      INR goal:  2.0-3.0   TTR:  72.8% (10.4 mo)   INR used for dosin.10 (2024)   Weekly warfarin total:  27.5 mg               Assessment/Plan   Therapeutic     1. New dose: no change    2. Next INR: 1 month      Education provided to patient during the visit:  Patient instructed to call in interim with questions, concerns and changes.   Patient educated on dietary consistency in vitamin k consumption.

## 2024-08-09 RX ORDER — SERTRALINE HYDROCHLORIDE 50 MG/1
50 TABLET, FILM COATED ORAL DAILY
Qty: 90 TABLET | Refills: 3 | Status: SHIPPED | OUTPATIENT
Start: 2024-08-09

## 2024-08-14 ENCOUNTER — OFFICE VISIT (OUTPATIENT)
Dept: CARDIOLOGY | Facility: HOSPITAL | Age: 79
End: 2024-08-14
Payer: MEDICARE

## 2024-08-14 ENCOUNTER — HOSPITAL ENCOUNTER (OUTPATIENT)
Dept: CARDIOLOGY | Facility: HOSPITAL | Age: 79
Discharge: HOME | End: 2024-08-14
Payer: MEDICARE

## 2024-08-14 VITALS
HEART RATE: 53 BPM | DIASTOLIC BLOOD PRESSURE: 70 MMHG | BODY MASS INDEX: 37.22 KG/M2 | WEIGHT: 218 LBS | SYSTOLIC BLOOD PRESSURE: 145 MMHG | HEIGHT: 64 IN

## 2024-08-14 DIAGNOSIS — I48.91 ATRIAL FIBRILLATION, UNSPECIFIED TYPE (MULTI): Primary | ICD-10-CM

## 2024-08-14 DIAGNOSIS — I35.9 NONRHEUMATIC AORTIC VALVE DISORDER, UNSPECIFIED: ICD-10-CM

## 2024-08-14 DIAGNOSIS — Z95.2 S/P TAVR (TRANSCATHETER AORTIC VALVE REPLACEMENT): ICD-10-CM

## 2024-08-14 LAB
AORTIC VALVE MEAN GRADIENT: 9 MMHG
AORTIC VALVE PEAK VELOCITY: 1.88 M/S
ATRIAL RATE: 277 BPM
AV PEAK GRADIENT: 14.1 MMHG
EJECTION FRACTION APICAL 4 CHAMBER: 73.4
EJECTION FRACTION: 66 %
LEFT ATRIUM VOLUME AREA LENGTH INDEX BSA: 51 ML/M2
LEFT VENTRICLE INTERNAL DIMENSION DIASTOLE: 4.88 CM (ref 3.5–6)
Q ONSET: 191 MS
QRS COUNT: 9 BEATS
QRS DURATION: 140 MS
QT INTERVAL: 464 MS
QTC CALCULATION(BAZETT): 435 MS
QTC FREDERICIA: 445 MS
R AXIS: -36 DEGREES
RIGHT VENTRICLE FREE WALL PEAK S': 9.75 CM/S
RIGHT VENTRICLE PEAK SYSTOLIC PRESSURE: 17.3 MMHG
T AXIS: -9 DEGREES
T OFFSET: 423 MS
TRICUSPID ANNULAR PLANE SYSTOLIC EXCURSION: 2.3 CM
VENTRICULAR RATE: 53 BPM

## 2024-08-14 PROCEDURE — 99214 OFFICE O/P EST MOD 30 MIN: CPT | Performed by: INTERNAL MEDICINE

## 2024-08-14 PROCEDURE — 3077F SYST BP >= 140 MM HG: CPT | Performed by: INTERNAL MEDICINE

## 2024-08-14 PROCEDURE — 99214 OFFICE O/P EST MOD 30 MIN: CPT | Mod: 25 | Performed by: INTERNAL MEDICINE

## 2024-08-14 PROCEDURE — 1160F RVW MEDS BY RX/DR IN RCRD: CPT | Performed by: INTERNAL MEDICINE

## 2024-08-14 PROCEDURE — 93306 TTE W/DOPPLER COMPLETE: CPT | Performed by: STUDENT IN AN ORGANIZED HEALTH CARE EDUCATION/TRAINING PROGRAM

## 2024-08-14 PROCEDURE — 93306 TTE W/DOPPLER COMPLETE: CPT

## 2024-08-14 PROCEDURE — 1159F MED LIST DOCD IN RCRD: CPT | Performed by: INTERNAL MEDICINE

## 2024-08-14 PROCEDURE — 93010 ELECTROCARDIOGRAM REPORT: CPT | Performed by: INTERNAL MEDICINE

## 2024-08-14 PROCEDURE — 1123F ACP DISCUSS/DSCN MKR DOCD: CPT | Performed by: INTERNAL MEDICINE

## 2024-08-14 PROCEDURE — 3078F DIAST BP <80 MM HG: CPT | Performed by: INTERNAL MEDICINE

## 2024-08-14 PROCEDURE — 1157F ADVNC CARE PLAN IN RCRD: CPT | Performed by: INTERNAL MEDICINE

## 2024-08-14 PROCEDURE — 1036F TOBACCO NON-USER: CPT | Performed by: INTERNAL MEDICINE

## 2024-08-14 PROCEDURE — 93005 ELECTROCARDIOGRAM TRACING: CPT | Performed by: INTERNAL MEDICINE

## 2024-08-14 NOTE — PROGRESS NOTES
Subjective:  Patient returns for an 8-month follow-up.  She is now about 1 year out following her TAVR for severe aortic stenosis.  We also follow her for hypertension, hyperlipidemia, and chronic atrial fibrillation.    She generally has been clinically stable since her last visit.  She remains limited but feels her activity tolerance is somewhat better following her TAVR last August.  She has not had any hospitalizations and denies any other new health concerns.  She denies any fevers or chills.  She denies any bleeding problems despite good compliance with her warfarin.  She is taking all her medications compliantly without side effect.  She overall is generally happy and comfortable with how she is doing.  She did have her follow-up echo today to keep an eye on her TAVR valve.    Objective:  General: Alert, usual self.  HEENT: Unchanged.  Lungs: Generally clear with good air exchange and no crackles or wheezing.  Cardiac: Distant heart tones with 1-2/6 systolic murmur.  Abdomen: Obese but nontender.  Extremities: Unchanged.  Skin: No acute rash.  Neuro: Intact and unchanged.    EKG: Atrial fibrillation with slow ventricular response rate.  Left axis deviation.  Right bundle branch block.    Lipid panel: Cholesterol-185, HDL-38, LDL-114, TG-165.    Impression/plan:  Kyleigh is doing well at this time.  I was pleased to see that her functional capacity appears to be a bit better following TAVR for her aortic stenosis.  I was pleased to see that her echocardiogram generally looked quite good with normal functioning of her TAVR.    Her blood pressure remains under good control on her current medical regimen.  We will continue these unchanged.    Her atrial fibrillation rate remains nicely controlled, so we will continue her metoprolol and diltiazem dosing unchanged.  She remains appropriately anticoagulated and continues to get followed closely in the [facility].    Her lipid panel looks reasonable on  WelChol and Zetia so we will continue these unchanged.    I will see her back for routine follow-up in 6 months, but she knows to call for any intercurrent concerns.  She did not need any prescriptions renewed.    Patient instructions:    Continue current medications unchanged.    Return to clinic in 6 months.    Call for any intercurrent problems.

## 2024-08-16 ENCOUNTER — TELEPHONE (OUTPATIENT)
Dept: CARDIOLOGY | Facility: CLINIC | Age: 79
End: 2024-08-16
Payer: MEDICARE

## 2024-08-20 ENCOUNTER — TELEPHONE (OUTPATIENT)
Dept: CARDIOLOGY | Facility: HOSPITAL | Age: 79
End: 2024-08-20
Payer: MEDICARE

## 2024-08-27 ENCOUNTER — TELEPHONE (OUTPATIENT)
Dept: PRIMARY CARE | Facility: CLINIC | Age: 79
End: 2024-08-27
Payer: MEDICARE

## 2024-08-27 NOTE — TELEPHONE ENCOUNTER
Patient called to say that she believes that she pinched a nerve in her back and is wondering if Ibuprofen is safe to use with her prescription medications.

## 2024-09-01 DIAGNOSIS — M1A.09X0 IDIOPATHIC CHRONIC GOUT OF MULTIPLE SITES WITHOUT TOPHUS: ICD-10-CM

## 2024-09-03 ENCOUNTER — APPOINTMENT (OUTPATIENT)
Dept: PRIMARY CARE | Facility: CLINIC | Age: 79
End: 2024-09-03
Payer: MEDICARE

## 2024-09-03 RX ORDER — ALLOPURINOL 300 MG/1
300 TABLET ORAL DAILY
Qty: 90 TABLET | Refills: 3 | Status: SHIPPED | OUTPATIENT
Start: 2024-09-03

## 2024-09-05 ENCOUNTER — ANTICOAGULATION - WARFARIN VISIT (OUTPATIENT)
Dept: CARDIOLOGY | Facility: CLINIC | Age: 79
End: 2024-09-05
Payer: MEDICARE

## 2024-09-05 DIAGNOSIS — I48.11 LONGSTANDING PERSISTENT ATRIAL FIBRILLATION (MULTI): Primary | ICD-10-CM

## 2024-09-05 DIAGNOSIS — I48.91 ATRIAL FIBRILLATION, UNSPECIFIED TYPE (MULTI): ICD-10-CM

## 2024-09-05 LAB
POC INR: 2.2
POC PROTHROMBIN TIME: NORMAL

## 2024-09-05 PROCEDURE — 99211 OFF/OP EST MAY X REQ PHY/QHP: CPT

## 2024-09-05 PROCEDURE — 85610 PROTHROMBIN TIME: CPT | Mod: QW

## 2024-09-05 NOTE — PROGRESS NOTES
Patient identification verified with 2 identifiers.    Location: [facility] - [address] [place], [place] [address] [phone]     Referring Physician: DR. CHEN  Enrollment/ Re-enrollment date: 25   INR Goal: 2.0-3.0  INR monitoring is per [facility] protocol.  Anticoagulation Medication: warfarin  Indication: Atrial Fibrillation/Atrial Flutter    Subjective   Bleeding signs/symptoms: No    Bruising: No   Major bleeding event: No  Thrombosis signs/symptoms: No  Thromboembolic event: No  Missed doses: No  Extra doses: No  Medication changes: No  Dietary changes: No  Change in health: No  Change in activity: No  Alcohol: No  Other concerns: No    Upcoming Procedures:  Does the Patient Have any upcoming procedures that require interruption in anticoagulation therapy? no  Does the patient require bridging? no      Anticoagulation Summary  As of 2024      INR goal:  2.0-3.0   TTR:  75.0% (11.3 mo)   INR used for dosin.20 (2024)   Weekly warfarin total:  27.5 mg               Assessment/Plan   Therapeutic     1. New dose: no change    2. Next INR: 1 month      Education provided to patient during the visit:  Patient instructed to call in interim with questions, concerns and changes.

## 2024-09-11 ENCOUNTER — OFFICE VISIT (OUTPATIENT)
Dept: PRIMARY CARE | Facility: CLINIC | Age: 79
End: 2024-09-11
Payer: MEDICARE

## 2024-09-11 VITALS
WEIGHT: 219.2 LBS | SYSTOLIC BLOOD PRESSURE: 132 MMHG | HEART RATE: 60 BPM | BODY MASS INDEX: 37.63 KG/M2 | OXYGEN SATURATION: 98 % | DIASTOLIC BLOOD PRESSURE: 60 MMHG

## 2024-09-11 DIAGNOSIS — N28.9 NEPHROPATHY: ICD-10-CM

## 2024-09-11 DIAGNOSIS — E78.2 MIXED HYPERLIPIDEMIA: ICD-10-CM

## 2024-09-11 DIAGNOSIS — Z86.73 HISTORY OF RECURRENT TIAS: ICD-10-CM

## 2024-09-11 DIAGNOSIS — I10 BENIGN ESSENTIAL HYPERTENSION: ICD-10-CM

## 2024-09-11 DIAGNOSIS — G47.33 OBSTRUCTIVE SLEEP APNEA SYNDROME: ICD-10-CM

## 2024-09-11 DIAGNOSIS — I48.11 LONGSTANDING PERSISTENT ATRIAL FIBRILLATION (MULTI): ICD-10-CM

## 2024-09-11 DIAGNOSIS — M1A.09X0 IDIOPATHIC CHRONIC GOUT OF MULTIPLE SITES WITHOUT TOPHUS: ICD-10-CM

## 2024-09-11 DIAGNOSIS — Z23 NEED FOR INFLUENZA VACCINATION: ICD-10-CM

## 2024-09-11 DIAGNOSIS — E11.59 TYPE 2 DIABETES MELLITUS WITH OTHER CIRCULATORY COMPLICATION, WITHOUT LONG-TERM CURRENT USE OF INSULIN (MULTI): Primary | ICD-10-CM

## 2024-09-11 DIAGNOSIS — Z95.2 S/P TAVR (TRANSCATHETER AORTIC VALVE REPLACEMENT): ICD-10-CM

## 2024-09-11 DIAGNOSIS — F41.8 DEPRESSION WITH ANXIETY: ICD-10-CM

## 2024-09-11 DIAGNOSIS — E66.01 MORBID (SEVERE) OBESITY DUE TO EXCESS CALORIES (MULTI): ICD-10-CM

## 2024-09-11 DIAGNOSIS — N18.4 CHRONIC RENAL DISEASE, STAGE IV (MULTI): ICD-10-CM

## 2024-09-11 DIAGNOSIS — E55.9 VITAMIN D DEFICIENCY: ICD-10-CM

## 2024-09-11 DIAGNOSIS — K21.9 GASTROESOPHAGEAL REFLUX DISEASE WITHOUT ESOPHAGITIS: ICD-10-CM

## 2024-09-11 PROBLEM — D68.9 COAGULATION DEFECT, UNSPECIFIED (MULTI): Status: RESOLVED | Noted: 2023-09-14 | Resolved: 2024-09-11

## 2024-09-11 LAB — POC HEMOGLOBIN A1C: 7.3 % (ref 4.2–6.5)

## 2024-09-11 PROCEDURE — 1123F ACP DISCUSS/DSCN MKR DOCD: CPT | Performed by: INTERNAL MEDICINE

## 2024-09-11 PROCEDURE — 1036F TOBACCO NON-USER: CPT | Performed by: INTERNAL MEDICINE

## 2024-09-11 PROCEDURE — 83036 HEMOGLOBIN GLYCOSYLATED A1C: CPT | Performed by: INTERNAL MEDICINE

## 2024-09-11 PROCEDURE — 1126F AMNT PAIN NOTED NONE PRSNT: CPT | Performed by: INTERNAL MEDICINE

## 2024-09-11 PROCEDURE — G2211 COMPLEX E/M VISIT ADD ON: HCPCS | Performed by: INTERNAL MEDICINE

## 2024-09-11 PROCEDURE — 90662 IIV NO PRSV INCREASED AG IM: CPT | Performed by: INTERNAL MEDICINE

## 2024-09-11 PROCEDURE — 3075F SYST BP GE 130 - 139MM HG: CPT | Performed by: INTERNAL MEDICINE

## 2024-09-11 PROCEDURE — 3078F DIAST BP <80 MM HG: CPT | Performed by: INTERNAL MEDICINE

## 2024-09-11 PROCEDURE — 99214 OFFICE O/P EST MOD 30 MIN: CPT | Performed by: INTERNAL MEDICINE

## 2024-09-11 PROCEDURE — 1159F MED LIST DOCD IN RCRD: CPT | Performed by: INTERNAL MEDICINE

## 2024-09-11 PROCEDURE — 1157F ADVNC CARE PLAN IN RCRD: CPT | Performed by: INTERNAL MEDICINE

## 2024-09-11 RX ORDER — IRON POLYSACCHARIDE COMPLEX 150 MG
150 CAPSULE ORAL DAILY
COMMUNITY

## 2024-09-11 ASSESSMENT — PAIN SCALES - GENERAL: PAINLEVEL: 0-NO PAIN

## 2024-09-11 ASSESSMENT — ENCOUNTER SYMPTOMS
ACTIVITY CHANGE: 0
ABDOMINAL PAIN: 0
OCCASIONAL FEELINGS OF UNSTEADINESS: 0
COUGH: 0
DEPRESSION: 0
ARTHRALGIAS: 1
CONSTITUTIONAL NEGATIVE: 1
DYSURIA: 0
WEAKNESS: 1
SHORTNESS OF BREATH: 0
CARDIOVASCULAR NEGATIVE: 1
LOSS OF SENSATION IN FEET: 0
CONSTIPATION: 0
DIARRHEA: 0
PSYCHIATRIC NEGATIVE: 1

## 2024-09-11 ASSESSMENT — PATIENT HEALTH QUESTIONNAIRE - PHQ9
SUM OF ALL RESPONSES TO PHQ9 QUESTIONS 1 AND 2: 0
1. LITTLE INTEREST OR PLEASURE IN DOING THINGS: NOT AT ALL
2. FEELING DOWN, DEPRESSED OR HOPELESS: NOT AT ALL

## 2024-09-11 NOTE — PROGRESS NOTES
Subjective   Patient ID: Kyleigh Chappell is a 78 y.o. female who presents for Follow-up.    Atrial Fibrillation--stable on meds-remains on coumadin-adjusted by cardio. 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  Sugars at home: 120-150, high if eats too many carbs.  Lab Results       Component                Value               Date                       HGBA1C                   8.8 (A)             11/20/2023                                             8.2                    2/24/2024                                             8.1                   5/31/2024                                             7.3                    9/9/2024    GERD-stable on  protonix    Gout-- On allopurinol. Last flare after lobster  Lab Results       Component                Value               Date                       URICACID                 4.1                 06/19/2024               Depression with anxiety---fairly stable w/ sertraline and needing more xanax with  and homeless granddtr living with her-not using daily.  Econsent signed 9/11/2024    CRF-22 last eGFR 6/2024; now anemic with low iron and taking oral iron--managed by Dr Lamas    Sleep apnea-uses cpap nightly--feels bad without it    H/o recurrant TIA's-no symptoms since on coumadin    Exercise-- rec center-water exercises, floor exercises       Diet tries to watch carbs and fat-moved to small plates    Dr Lamas-added iron daily for anemia           Review of Systems   Constitutional: Negative.  Negative for activity change.        Slowly losing weight   Respiratory:  Negative for cough and shortness of breath.    Cardiovascular: Negative.         Able to exercise without difficulty   Gastrointestinal:  Negative for abdominal pain, constipation and diarrhea.   Genitourinary:  Negative for dysuria.   Musculoskeletal:  Positive for arthralgias.        Using  voltaren gel when has pain   Skin:  Negative for rash.   Neurological:  Positive for weakness (legs already stronger with exercise).   Psychiatric/Behavioral: Negative.          Caring for nabeel-needs daily treatment for lymph edema       Objective   /60 (BP Location: Left arm, Patient Position: Sitting)   Pulse 60   Wt 99.4 kg (219 lb 3.2 oz)   SpO2 98%   BMI 37.63 kg/m²     Physical Exam  Constitutional:       General: She is not in acute distress.     Appearance: Normal appearance.   Cardiovascular:      Rate and Rhythm: Normal rate. Rhythm irregular.      Heart sounds: Murmur heard.      No gallop.   Pulmonary:      Breath sounds: Normal breath sounds. No wheezing, rhonchi or rales.   Skin:     General: Skin is warm and dry.      Findings: No rash.   Neurological:      General: No focal deficit present.      Mental Status: She is alert and oriented to person, place, and time.   Psychiatric:         Mood and Affect: Mood normal.      Comments: Frustrated with [facility]         Assessment/Plan   Diagnoses and all orders for this visit:  Type 2 diabetes mellitus with other circulatory complication, without long-term current use of insulin (Multi)  -     POCT glycosylated hemoglobin (Hb A1C) manually resulted  S/P TAVR (transcatheter aortic valve replacement)  Mixed hyperlipidemia  Benign essential hypertension  Longstanding persistent atrial fibrillation (Multi)  Vitamin D deficiency  Gastroesophageal reflux disease without esophagitis  Nephropathy  Depression with anxiety  Idiopathic chronic gout of multiple sites without tophus  History of recurrent TIAs  Obstructive sleep apnea syndrome  Chronic renal disease, stage IV (Multi)  Morbid (severe) obesity due to excess calories (Multi)  Need for influenza vaccination  -     Flu vaccine, trivalent, preservative free, HIGH-DOSE, age 65y+ (Fluzone)  Other orders  -     Follow Up In Primary Care - Established  -     Follow Up In Primary Care - Established;  Future      Current Outpatient Medications:     allopurinol (Zyloprim) 300 mg tablet, TAKE ONE TABLET BY MOUTH EVERY DAY, Disp: 90 tablet, Rfl: 3    ALPRAZolam (Xanax) 0.25 mg tablet, Take 1 tablet (0.25 mg) by mouth 3 times a day as needed for anxiety. TAKE 1/2 TO 1 TABLET BY MOUTH THREE TIMES A DAY if needed for 30 days, Disp: 60 tablet, Rfl: 3    ascorbic acid (Vitamin C) 500 mg tablet, Take 1 tablet (500 mg) by mouth 2 times a day. With iron tablet, Disp: , Rfl:     aspirin 81 mg chewable tablet, Chew 1 tablet (81 mg) once daily., Disp: , Rfl:     blood sugar diagnostic (OneTouch Verio test strips) strip, use daily as directed, Disp: 100 each, Rfl: 3    cholecalciferol, vitamin D3, (VITAMIN D3 ORAL), Take 1 capsule by mouth 2 times a week., Disp: , Rfl:     colesevelam (Welchol) 625 mg tablet, Take 3 tablets (1,875 mg) by mouth 2 times daily (morning and late afternoon)., Disp: , Rfl:     diclofenac sodium (Voltaren) 1 % gel gel, Apply topically 3 times a day., Disp: , Rfl:     dilTIAZem CD (Cardizem CD) 180 mg 24 hr capsule, TAKE ONE CAPSULE BY MOUTH EVERY DAY, Disp: 90 capsule, Rfl: 3    empagliflozin (Jardiance) 25 mg, Take 1 tablet (25 mg) by mouth once daily., Disp: 90 tablet, Rfl: 3    ergocalciferol (Vitamin D-2) 1.25 MG (52386 UT) capsule, Take 1 capsule (1,250 mcg) by mouth 2 times a week., Disp: , Rfl:     ezetimibe (Zetia) 10 mg tablet, Take 1 tablet (10 mg) by mouth once daily at bedtime., Disp: , Rfl:     furosemide (Lasix) 20 mg tablet, Take 0.5 tablets (10 mg) by mouth once daily., Disp: , Rfl:     glimepiride (Amaryl) 2 mg tablet, Take 1 tablet (2 mg) by mouth once daily in the morning. Take before meals., Disp: 90 tablet, Rfl: 3    iron polysaccharides (Nu-Iron,Niferex) 150 mg iron capsule, Take 1 capsule (150 mg) by mouth once daily., Disp: , Rfl:     lancets (OneTouch Delica Plus Lancet) 33 gauge misc, use daily, Disp: 100 each, Rfl: 3    lancets misc, once daily., Disp: , Rfl:     metFORMIN  (Glucophage) 500 mg tablet, TAKE ONE TABLET BY MOUTH TWO TIMES A DAY, Disp: 180 tablet, Rfl: 3    metoprolol tartrate (Lopressor) 50 mg tablet, TAKE ONE TABLET BY MOUTH TWO TIMES A DAY, Disp: 180 tablet, Rfl: 3    pantoprazole (ProtoNix) 40 mg EC tablet, TAKE ONE TABLET BY MOUTH TWO TIMES A DAY, Disp: 180 tablet, Rfl: 3    sertraline (Zoloft) 50 mg tablet, TAKE ONE TABLET BY MOUTH DAILY, Disp: 90 tablet, Rfl: 3    warfarin (Jantoven) 2.5 mg tablet, Take 1 tablet (2.5 mg) by mouth 3 times a week. 1 tab(s) orally once a day 3 days a week (mon,  weds, friday, ), Disp: , Rfl:     warfarin (Jantoven) 5 mg tablet, 1 tab(s) orally once three days a week and 2.5mg four days a week. (Patient taking differently: Take 1 tablet (5 mg) by mouth 4 times a week. 1 tab(s) orally once FOUR days a week and 2.5mg THREE days a week.), Disp: 90 tablet, Rfl: 1

## 2024-09-28 DIAGNOSIS — I48.11 LONGSTANDING PERSISTENT ATRIAL FIBRILLATION (MULTI): ICD-10-CM

## 2024-09-30 RX ORDER — WARFARIN SODIUM 5 MG/1
TABLET ORAL
Qty: 90 TABLET | Refills: 0 | Status: SHIPPED | OUTPATIENT
Start: 2024-09-30

## 2024-10-03 ENCOUNTER — ANTICOAGULATION - WARFARIN VISIT (OUTPATIENT)
Dept: CARDIOLOGY | Facility: CLINIC | Age: 79
End: 2024-10-03
Payer: MEDICARE

## 2024-10-03 DIAGNOSIS — I48.11 LONGSTANDING PERSISTENT ATRIAL FIBRILLATION (MULTI): Primary | ICD-10-CM

## 2024-10-03 LAB
POC INR: 1.7
POC PROTHROMBIN TIME: NORMAL

## 2024-10-03 PROCEDURE — 99211 OFF/OP EST MAY X REQ PHY/QHP: CPT

## 2024-10-03 PROCEDURE — 85610 PROTHROMBIN TIME: CPT | Mod: QW

## 2024-10-03 NOTE — PROGRESS NOTES
Patient identification verified with 2 identifiers.    Location: [facility] - [address] [place], [place] [address] [phone]     Referring Physician: DR. CHEN  Enrollment/ Re-enrollment date: 25   INR Goal: 2.0-3.0  INR monitoring is per [facility] protocol.  Anticoagulation Medication: warfarin  Indication: Atrial Fibrillation/Atrial Flutter    Subjective   Bleeding signs/symptoms: Yes  PT HAD SOME BLEEDING IN HER MOUTH EARLIER IN THE WEEK.  IT HAS STOPPED.    Bruising: No   Major bleeding event: No  Thrombosis signs/symptoms: No  Thromboembolic event: No  Missed doses: Yes  PT HELD HER WARFARIN ONE DAY LAST WEEK DUE TO BLEEDING.  Extra doses: No  Medication changes: No  Dietary changes: No  Change in health: No  Change in activity: No  Alcohol: No  Other concerns: No    Upcoming Procedures:  Does the Patient Have any upcoming procedures that require interruption in anticoagulation therapy? no  Does the patient require bridging? no      Anticoagulation Summary  As of 10/3/2024      INR goal:  2.0-3.0   TTR:  72.4% (1 y)   INR used for dosin.70 (10/3/2024)   Weekly warfarin total:  27.5 mg               Assessment/Plan   Subtherapeutic     1. New dose:  PT WILL TAKE AN EXTRA 2.5MG TONIGHT THEN MAINTAIN CURRENT WARFARIN DOSE.       2. Next INR: 1 week      Education provided to patient during the visit:  Patient instructed to call in interim with questions, concerns and changes.   Patient educated on signs of bleeding/clotting.   Patient educated on compliance with dosing, follow up appointments, and prescribed plan of care.

## 2024-10-10 ENCOUNTER — ANTICOAGULATION - WARFARIN VISIT (OUTPATIENT)
Dept: CARDIOLOGY | Facility: CLINIC | Age: 79
End: 2024-10-10
Payer: MEDICARE

## 2024-10-10 DIAGNOSIS — I48.91 ATRIAL FIBRILLATION, UNSPECIFIED TYPE (MULTI): Primary | ICD-10-CM

## 2024-10-10 LAB
POC INR: 2.5
POC PROTHROMBIN TIME: NORMAL

## 2024-10-10 PROCEDURE — 85610 PROTHROMBIN TIME: CPT | Mod: QW

## 2024-10-10 PROCEDURE — 99211 OFF/OP EST MAY X REQ PHY/QHP: CPT

## 2024-10-10 NOTE — PROGRESS NOTES
Patient identification verified with 2 identifiers.    Location: [facility] - [address] [place], [place] [address] [phone]     Referring Physician: Dr Mandujano  Enrollment/ Re-enrollment date: 2025   INR Goal: 2.0-3.0  INR monitoring is per [facility] protocol.  Anticoagulation Medication: warfarin  Indication: Atrial Fibrillation/Atrial Flutter    Subjective   Bleeding signs/symptoms: No    Bruising: No   Major bleeding event: No  Thrombosis signs/symptoms: No  Thromboembolic event: No  Missed doses: No  Extra doses: No  Medication changes: No  Dietary changes: No  Change in health: No  Change in activity: No  Alcohol: No  Other concerns: No    Upcoming Procedures:  Does the Patient Have any upcoming procedures that require interruption in anticoagulation therapy? no  Does the patient require bridging? no      Anticoagulation Summary  As of 10/10/2024      INR goal:  2.0-3.0   TTR:  72.2% (1 y)   INR used for dosin.50 (10/10/2024)   Weekly warfarin total:  27.5 mg               Assessment/Plan   Therapeutic     1. New dose: no change    2. Next INR: 2 weeks      Education provided to patient during the visit:  Patient instructed to call in interim with questions, concerns and changes.   Patient educated on interactions between medications and warfarin.   Patient educated on dietary consistency in vitamin k consumption.   Patient educated on affects of alcohol consumption while taking warfarin.   Patient educated on signs of bleeding/clotting.   Patient educated on compliance with dosing, follow up appointments, and prescribed plan of care.

## 2024-10-24 ENCOUNTER — APPOINTMENT (OUTPATIENT)
Dept: CARDIOLOGY | Facility: CLINIC | Age: 79
End: 2024-10-24
Payer: MEDICARE

## 2024-10-24 ENCOUNTER — ANTICOAGULATION - WARFARIN VISIT (OUTPATIENT)
Dept: CARDIOLOGY | Facility: CLINIC | Age: 79
End: 2024-10-24
Payer: MEDICARE

## 2024-10-24 DIAGNOSIS — I48.11 LONGSTANDING PERSISTENT ATRIAL FIBRILLATION (MULTI): Primary | ICD-10-CM

## 2024-10-25 ENCOUNTER — ANTICOAGULATION - WARFARIN VISIT (OUTPATIENT)
Dept: CARDIOLOGY | Facility: CLINIC | Age: 79
End: 2024-10-25
Payer: MEDICARE

## 2024-10-25 DIAGNOSIS — I48.11 LONGSTANDING PERSISTENT ATRIAL FIBRILLATION (MULTI): ICD-10-CM

## 2024-10-25 LAB
POC INR: 2.2
POC PROTHROMBIN TIME: NORMAL

## 2024-10-25 PROCEDURE — 99211 OFF/OP EST MAY X REQ PHY/QHP: CPT

## 2024-10-25 PROCEDURE — 85610 PROTHROMBIN TIME: CPT | Mod: QW

## 2024-10-25 NOTE — PROGRESS NOTES
Patient identification verified with 2 identifiers.    Location: [facility] - [address] [facility]. [place], [place] [address] [phone]     Referring Physician: Dr Mandujano  Enrollment/ Re-enrollment date: 25   INR Goal: 2.0-3.0  INR monitoring is per [facility] protocol.  Anticoagulation Medication: warfarin  Indication: Atrial Fibrillation/Atrial Flutter    Subjective   Bleeding signs/symptoms: No    Bruising: No   Major bleeding event: No  Thrombosis signs/symptoms: No  Thromboembolic event: No  Missed doses: No  Extra doses: No  Medication changes: No  Dietary changes: No  Change in health: No  Change in activity: No  Alcohol: No  Other concerns: No    Upcoming Procedures:  Does the Patient Have any upcoming procedures that require interruption in anticoagulation therapy? no  Does the patient require bridging? no      Anticoagulation Summary  As of 10/25/2024      INR goal:  2.0-3.0   TTR:  73.3% (1.1 y)   INR used for dosin.20 (10/25/2024)   Weekly warfarin total:  27.5 mg               Assessment/Plan   Therapeutic     1. New dose: no change    2. Next INR: 1 month      Education provided to patient during the visit:  Patient instructed to call in interim with questions, concerns and changes.   Patient educated on interactions between medications and warfarin.   Patient educated on dietary consistency in vitamin k consumption.   Patient educated on affects of alcohol consumption while taking warfarin.   Patient educated on signs of bleeding/clotting.   Patient educated on compliance with dosing, follow up appointments, and prescribed plan of care.

## 2024-10-29 DIAGNOSIS — I48.11 LONGSTANDING PERSISTENT ATRIAL FIBRILLATION (MULTI): ICD-10-CM

## 2024-10-29 DIAGNOSIS — I10 BENIGN ESSENTIAL HYPERTENSION: ICD-10-CM

## 2024-10-29 DIAGNOSIS — M1A.09X0 IDIOPATHIC CHRONIC GOUT OF MULTIPLE SITES WITHOUT TOPHUS: ICD-10-CM

## 2024-10-29 DIAGNOSIS — E78.2 MIXED HYPERLIPIDEMIA: ICD-10-CM

## 2024-10-29 DIAGNOSIS — E11.59 TYPE 2 DIABETES MELLITUS WITH OTHER CIRCULATORY COMPLICATION, WITHOUT LONG-TERM CURRENT USE OF INSULIN: ICD-10-CM

## 2024-10-29 DIAGNOSIS — F41.8 DEPRESSION WITH ANXIETY: ICD-10-CM

## 2024-10-29 RX ORDER — WARFARIN SODIUM 5 MG/1
TABLET ORAL
Qty: 90 TABLET | Refills: 3 | Status: SHIPPED | OUTPATIENT
Start: 2024-10-29

## 2024-10-29 RX ORDER — METFORMIN HYDROCHLORIDE 500 MG/1
500 TABLET ORAL
Qty: 180 TABLET | Refills: 3 | Status: SHIPPED | OUTPATIENT
Start: 2024-10-29

## 2024-10-29 RX ORDER — ALPRAZOLAM 0.25 MG/1
0.25 TABLET ORAL 3 TIMES DAILY PRN
Qty: 90 TABLET | Refills: 1 | Status: SHIPPED | OUTPATIENT
Start: 2024-10-29

## 2024-10-29 RX ORDER — METOPROLOL TARTRATE 50 MG/1
50 TABLET ORAL 2 TIMES DAILY
Qty: 180 TABLET | Refills: 3 | Status: SHIPPED | OUTPATIENT
Start: 2024-10-29

## 2024-10-29 RX ORDER — ALLOPURINOL 300 MG/1
300 TABLET ORAL DAILY
Qty: 90 TABLET | Refills: 3 | Status: SHIPPED | OUTPATIENT
Start: 2024-10-29

## 2024-10-29 RX ORDER — GLIMEPIRIDE 2 MG/1
2 TABLET ORAL
Qty: 90 TABLET | Refills: 3 | Status: SHIPPED | OUTPATIENT
Start: 2024-10-29

## 2024-10-29 RX ORDER — DILTIAZEM HYDROCHLORIDE 180 MG/1
180 CAPSULE, COATED, EXTENDED RELEASE ORAL DAILY
Qty: 90 CAPSULE | Refills: 3 | Status: SHIPPED | OUTPATIENT
Start: 2024-10-29

## 2024-11-13 ENCOUNTER — OFFICE VISIT (OUTPATIENT)
Dept: PRIMARY CARE | Facility: CLINIC | Age: 79
End: 2024-11-13
Payer: MEDICARE

## 2024-11-13 VITALS
SYSTOLIC BLOOD PRESSURE: 126 MMHG | WEIGHT: 216.4 LBS | HEART RATE: 68 BPM | BODY MASS INDEX: 37.14 KG/M2 | OXYGEN SATURATION: 98 % | DIASTOLIC BLOOD PRESSURE: 68 MMHG

## 2024-11-13 DIAGNOSIS — B02.9 HERPES ZOSTER WITHOUT COMPLICATION: Primary | ICD-10-CM

## 2024-11-13 PROCEDURE — 1036F TOBACCO NON-USER: CPT | Performed by: INTERNAL MEDICINE

## 2024-11-13 PROCEDURE — 3078F DIAST BP <80 MM HG: CPT | Performed by: INTERNAL MEDICINE

## 2024-11-13 PROCEDURE — 1123F ACP DISCUSS/DSCN MKR DOCD: CPT | Performed by: INTERNAL MEDICINE

## 2024-11-13 PROCEDURE — 1157F ADVNC CARE PLAN IN RCRD: CPT | Performed by: INTERNAL MEDICINE

## 2024-11-13 PROCEDURE — 1159F MED LIST DOCD IN RCRD: CPT | Performed by: INTERNAL MEDICINE

## 2024-11-13 PROCEDURE — 3074F SYST BP LT 130 MM HG: CPT | Performed by: INTERNAL MEDICINE

## 2024-11-13 PROCEDURE — 99214 OFFICE O/P EST MOD 30 MIN: CPT | Performed by: INTERNAL MEDICINE

## 2024-11-13 PROCEDURE — 1125F AMNT PAIN NOTED PAIN PRSNT: CPT | Performed by: INTERNAL MEDICINE

## 2024-11-13 RX ORDER — VALACYCLOVIR HYDROCHLORIDE 1 G/1
1000 TABLET, FILM COATED ORAL 3 TIMES DAILY
Qty: 21 TABLET | Refills: 0 | Status: SHIPPED | OUTPATIENT
Start: 2024-11-13 | End: 2024-11-20

## 2024-11-13 RX ORDER — GABAPENTIN 100 MG/1
100 CAPSULE ORAL 3 TIMES DAILY
Qty: 90 CAPSULE | Refills: 3 | Status: SHIPPED | OUTPATIENT
Start: 2024-11-13 | End: 2025-11-13

## 2024-11-13 ASSESSMENT — ENCOUNTER SYMPTOMS
DEPRESSION: 0
LOSS OF SENSATION IN FEET: 0
OCCASIONAL FEELINGS OF UNSTEADINESS: 0

## 2024-11-13 ASSESSMENT — PATIENT HEALTH QUESTIONNAIRE - PHQ9
SUM OF ALL RESPONSES TO PHQ9 QUESTIONS 1 AND 2: 0
2. FEELING DOWN, DEPRESSED OR HOPELESS: NOT AT ALL
1. LITTLE INTEREST OR PLEASURE IN DOING THINGS: NOT AT ALL

## 2024-11-13 ASSESSMENT — PAIN SCALES - GENERAL: PAINLEVEL_OUTOF10: 4

## 2024-11-13 NOTE — PROGRESS NOTES
Subjective   Patient ID: Kyleigh Chappell is a 79 y.o. female who presents for left shoulder issue.    3 weeks ago with burning tingling itching pain behind R shoulder. Feels like shingles but so far no rash. able to move arm.  Hurts to lean against something.     Losing weight on husbands diet-he is on wegovy. Otherwise she is doing very well             Objective   /68 (BP Location: Left arm, Patient Position: Sitting)   Pulse 68   Wt 98.2 kg (216 lb 6.4 oz)   SpO2 98%   BMI 37.14 kg/m²     Physical Exam  Musculoskeletal:      Comments: FROM left shoulder, strength intact left arm; surface tenderness around shoulder blade with some patchy redness and small lesions         Assessment/Plan   Assessment & Plan  Herpes zoster without complication    Orders:    valACYclovir (Valtrex) 1 gram tablet; Take 1 tablet (1,000 mg) by mouth 3 times a day for 7 days.    gabapentin (Neurontin) 100 mg capsule; Take 1 capsule (100 mg) by mouth 3 times a day.    Agree with her interpretation-will treat as shingles and she will call if doesn't help

## 2024-11-14 ENCOUNTER — ANTICOAGULATION - WARFARIN VISIT (OUTPATIENT)
Dept: CARDIOLOGY | Facility: CLINIC | Age: 79
End: 2024-11-14
Payer: MEDICARE

## 2024-11-14 ENCOUNTER — OFFICE VISIT (OUTPATIENT)
Dept: SLEEP MEDICINE | Facility: CLINIC | Age: 79
End: 2024-11-14
Payer: MEDICARE

## 2024-11-14 ENCOUNTER — LAB (OUTPATIENT)
Dept: LAB | Facility: LAB | Age: 79
End: 2024-11-14
Payer: MEDICARE

## 2024-11-14 VITALS
BODY MASS INDEX: 36.54 KG/M2 | OXYGEN SATURATION: 95 % | HEIGHT: 64 IN | SYSTOLIC BLOOD PRESSURE: 161 MMHG | HEART RATE: 64 BPM | WEIGHT: 214 LBS | DIASTOLIC BLOOD PRESSURE: 88 MMHG

## 2024-11-14 DIAGNOSIS — E78.49 OTHER HYPERLIPIDEMIA: ICD-10-CM

## 2024-11-14 DIAGNOSIS — I10 BENIGN ESSENTIAL HYPERTENSION: Primary | ICD-10-CM

## 2024-11-14 DIAGNOSIS — E11.21 TYPE 2 DIABETES MELLITUS WITH DIABETIC NEPHROPATHY: ICD-10-CM

## 2024-11-14 DIAGNOSIS — M10.00 IDIOPATHIC GOUT, UNSPECIFIED SITE: ICD-10-CM

## 2024-11-14 DIAGNOSIS — N18.30 CHRONIC KIDNEY DISEASE, STAGE 3 UNSPECIFIED (MULTI): Primary | ICD-10-CM

## 2024-11-14 DIAGNOSIS — I10 ESSENTIAL (PRIMARY) HYPERTENSION: ICD-10-CM

## 2024-11-14 DIAGNOSIS — G47.33 OBSTRUCTIVE SLEEP APNEA SYNDROME: ICD-10-CM

## 2024-11-14 DIAGNOSIS — I48.11 LONGSTANDING PERSISTENT ATRIAL FIBRILLATION (MULTI): Primary | ICD-10-CM

## 2024-11-14 LAB
ALBUMIN SERPL BCP-MCNC: 4.2 G/DL (ref 3.4–5)
ANION GAP SERPL CALC-SCNC: 15 MMOL/L (ref 10–20)
APPEARANCE UR: ABNORMAL
BILIRUB UR STRIP.AUTO-MCNC: NEGATIVE MG/DL
BUN SERPL-MCNC: 39 MG/DL (ref 6–23)
CALCIUM SERPL-MCNC: 9 MG/DL (ref 8.6–10.6)
CHLORIDE SERPL-SCNC: 103 MMOL/L (ref 98–107)
CO2 SERPL-SCNC: 26 MMOL/L (ref 21–32)
COLOR UR: YELLOW
CREAT SERPL-MCNC: 1.78 MG/DL (ref 0.5–1.05)
CREAT UR-MCNC: 42.4 MG/DL (ref 20–320)
CREAT UR-MCNC: 42.4 MG/DL (ref 20–320)
EGFRCR SERPLBLD CKD-EPI 2021: 29 ML/MIN/1.73M*2
ERYTHROCYTE [DISTWIDTH] IN BLOOD BY AUTOMATED COUNT: 18.2 % (ref 11.5–14.5)
GLUCOSE SERPL-MCNC: 164 MG/DL (ref 74–99)
GLUCOSE UR STRIP.AUTO-MCNC: ABNORMAL MG/DL
HCT VFR BLD AUTO: 37.7 % (ref 36–46)
HGB BLD-MCNC: 10.9 G/DL (ref 12–16)
KETONES UR STRIP.AUTO-MCNC: NEGATIVE MG/DL
LEUKOCYTE ESTERASE UR QL STRIP.AUTO: ABNORMAL
MCH RBC QN AUTO: 22.2 PG (ref 26–34)
MCHC RBC AUTO-ENTMCNC: 28.9 G/DL (ref 32–36)
MCV RBC AUTO: 77 FL (ref 80–100)
MICROALBUMIN UR-MCNC: <7 MG/L
MICROALBUMIN/CREAT UR: NORMAL MG/G{CREAT}
NITRITE UR QL STRIP.AUTO: NEGATIVE
NRBC BLD-RTO: 0 /100 WBCS (ref 0–0)
PH UR STRIP.AUTO: 5.5 [PH]
PHOSPHATE SERPL-MCNC: 4.6 MG/DL (ref 2.5–4.9)
PLATELET # BLD AUTO: 347 X10*3/UL (ref 150–450)
POC INR: 2.3
POC PROTHROMBIN TIME: NORMAL
POTASSIUM SERPL-SCNC: 4.7 MMOL/L (ref 3.5–5.3)
PROT UR STRIP.AUTO-MCNC: NEGATIVE MG/DL
PROT UR-ACNC: 13 MG/DL (ref 5–24)
PROT/CREAT UR: 0.31 MG/MG CREAT (ref 0–0.17)
RBC # BLD AUTO: 4.92 X10*6/UL (ref 4–5.2)
RBC # UR STRIP.AUTO: NEGATIVE /UL
RBC #/AREA URNS AUTO: >20 /HPF
SODIUM SERPL-SCNC: 139 MMOL/L (ref 136–145)
SP GR UR STRIP.AUTO: 1.01
SQUAMOUS #/AREA URNS AUTO: ABNORMAL /HPF
UROBILINOGEN UR STRIP.AUTO-MCNC: NORMAL MG/DL
WBC # BLD AUTO: 7 X10*3/UL (ref 4.4–11.3)
WBC #/AREA URNS AUTO: ABNORMAL /HPF
YEAST BUDDING #/AREA UR COMP ASSIST: PRESENT /HPF

## 2024-11-14 PROCEDURE — 3077F SYST BP >= 140 MM HG: CPT | Performed by: NURSE PRACTITIONER

## 2024-11-14 PROCEDURE — 84156 ASSAY OF PROTEIN URINE: CPT

## 2024-11-14 PROCEDURE — 99204 OFFICE O/P NEW MOD 45 MIN: CPT | Performed by: NURSE PRACTITIONER

## 2024-11-14 PROCEDURE — 81001 URINALYSIS AUTO W/SCOPE: CPT

## 2024-11-14 PROCEDURE — 3079F DIAST BP 80-89 MM HG: CPT | Performed by: NURSE PRACTITIONER

## 2024-11-14 PROCEDURE — 85027 COMPLETE CBC AUTOMATED: CPT

## 2024-11-14 PROCEDURE — 99211 OFF/OP EST MAY X REQ PHY/QHP: CPT

## 2024-11-14 PROCEDURE — 36415 COLL VENOUS BLD VENIPUNCTURE: CPT

## 2024-11-14 PROCEDURE — 1123F ACP DISCUSS/DSCN MKR DOCD: CPT | Performed by: NURSE PRACTITIONER

## 2024-11-14 PROCEDURE — 85610 PROTHROMBIN TIME: CPT | Mod: QW

## 2024-11-14 PROCEDURE — 1157F ADVNC CARE PLAN IN RCRD: CPT | Performed by: NURSE PRACTITIONER

## 2024-11-14 PROCEDURE — 82043 UR ALBUMIN QUANTITATIVE: CPT

## 2024-11-14 PROCEDURE — 82570 ASSAY OF URINE CREATININE: CPT

## 2024-11-14 PROCEDURE — 1036F TOBACCO NON-USER: CPT | Performed by: NURSE PRACTITIONER

## 2024-11-14 PROCEDURE — 1160F RVW MEDS BY RX/DR IN RCRD: CPT | Performed by: NURSE PRACTITIONER

## 2024-11-14 PROCEDURE — 99214 OFFICE O/P EST MOD 30 MIN: CPT | Performed by: NURSE PRACTITIONER

## 2024-11-14 PROCEDURE — 80069 RENAL FUNCTION PANEL: CPT

## 2024-11-14 PROCEDURE — 99417 PROLNG OP E/M EACH 15 MIN: CPT | Performed by: NURSE PRACTITIONER

## 2024-11-14 PROCEDURE — 1159F MED LIST DOCD IN RCRD: CPT | Performed by: NURSE PRACTITIONER

## 2024-11-14 ASSESSMENT — SLEEP AND FATIGUE QUESTIONNAIRES
DIFFICULTY_FALLING_ASLEEP: MODERATE
HOW LIKELY ARE YOU TO NOD OFF OR FALL ASLEEP WHILE SITTING AND READING: WOULD NEVER DOZE
HOW LIKELY ARE YOU TO NOD OFF OR FALL ASLEEP WHILE SITTING AND TALKING TO SOMEONE: WOULD NEVER DOZE
HOW LIKELY ARE YOU TO NOD OFF OR FALL ASLEEP WHILE WATCHING TV: WOULD NEVER DOZE
HOW LIKELY ARE YOU TO NOD OFF OR FALL ASLEEP IN A CAR, WHILE STOPPED FOR A FEW MINUTES IN TRAFFIC: WOULD NEVER DOZE
SLEEP_PROBLEM_INTERFERES_DAILY_ACTIVITIES: NOT AT ALL NOTICEABLE
SATISFACTION_WITH_CURRENT_SLEEP_PATTERN: VERY SATISFIED
WORRIED_DISTRESSED_DUE_TO_SLEEP: NOT AT ALL NOTICEABLE
SITING INACTIVE IN A PUBLIC PLACE LIKE A CLASS ROOM OR A MOVIE THEATER: WOULD NEVER DOZE
HOW LIKELY ARE YOU TO NOD OFF OR FALL ASLEEP WHEN YOU ARE A PASSENGER IN A CAR FOR AN HOUR WITHOUT A BREAK: WOULD NEVER DOZE
HOW LIKELY ARE YOU TO NOD OFF OR FALL ASLEEP WHILE LYING DOWN TO REST IN THE AFTERNOON WHEN CIRCUMSTANCES PERMIT: SLIGHT CHANCE OF DOZING
HOW LIKELY ARE YOU TO NOD OFF OR FALL ASLEEP WHILE SITTING QUIETLY AFTER LUNCH WITHOUT ALCOHOL: WOULD NEVER DOZE
SLEEP_PROBLEM_NOTICEABLE_TO_OTHERS: NOT AT ALL NOTICEABLE
DIFFICULTY_STAYING_ASLEEP: MODERATE
ESS-CHAD TOTAL SCORE: 1

## 2024-11-14 ASSESSMENT — COLUMBIA-SUICIDE SEVERITY RATING SCALE - C-SSRS
1. IN THE PAST MONTH, HAVE YOU WISHED YOU WERE DEAD OR WISHED YOU COULD GO TO SLEEP AND NOT WAKE UP?: NO
6. HAVE YOU EVER DONE ANYTHING, STARTED TO DO ANYTHING, OR PREPARED TO DO ANYTHING TO END YOUR LIFE?: NO
2. HAVE YOU ACTUALLY HAD ANY THOUGHTS OF KILLING YOURSELF?: NO

## 2024-11-14 ASSESSMENT — ENCOUNTER SYMPTOMS
LOSS OF SENSATION IN FEET: 0
DEPRESSION: 0
OCCASIONAL FEELINGS OF UNSTEADINESS: 0

## 2024-11-14 NOTE — PROGRESS NOTES
Patient: Kyleigh Chappell    [id number]  : 1945 -- AGE 79 y.o.    Provider: JOSE ARMANDO Jaffe     Location [facility]   Service Date: 2024              [facility]  New Visit Note      HISTORY OF PRESENT ILLNESS     The patient's referring provider is: No ref. provider found    HISTORY OF PRESENT ILLNESS   Kyleigh Chappell is a 79 y.o. female who presents to a [facility] for a sleep medicine evaluation with concerns of TORSTEN. She is accompanied by her  Marko who I saw in concurrently today.     The patient has h/o  has a past medical history of Anxiety, Depression, Diabetes mellitus (Multi), Essential (primary) hypertension (2022), GERD (gastroesophageal reflux disease), and Unspecified atrial fibrillation (Multi) (2023)..    Past Sleep History  Patient has had sleep testing in the past. No record available today in clinic/     Current History    On today's visit, the patient reports wearing CPAP faithfully since diagnosis. Notes she was falling asleep driving, was not functioning well. Original setting on 4 was too low. Was having trouble tolerating machine, benefiting from machine. States she cannot sleep without it now. She will often put it on when she lays down to rest in case she fall asleep. Notes she has never had someone monitor her machine. Was interested in establishing care. Using [employer] for supplies. Getting equipment regularly. Wanted to know if her air pressure settings were correct. Notes she does not open her mouth when she is sleeping. Occasionally has trouble falling asleep. Attributes to rumination. Notes it does not bother her has her life allows for flexibility in her sleep patterns.     Sleep schedule  on weekdays / work days:  Usual Bedtime:    11 PM  Falls asleep around:    # of awakenings:   Wake time:  9 AM- sometimes more    Naps: sometimes      Preferred sleeping position: back, side     Sleep-related ROS:    Problems going to sleep: rumination/thoughts/worries    Problems staying asleep Problems Staying Asleep: nocturia    Breathing during sleep: no symptoms of snoring or concerns of breathing at night with CPAP    Daytime Symptoms  On awakening patient reports: no morning symptoms    RLS screen:  RLSSCREEN: - Sensations: Patient does not have unusual sensations in their extremities that cause an urge to move them     Sleep-related behaviors:  denies     Fatigue: denies     ESS: 1   MELQUIADES: 4  FOSQ:  36      REVIEW OF SYSTEMS     REVIEW OF SYSTEMS  Review of Systems   All other systems reviewed and are negative.      ALLERGIES AND MEDICATIONS     ALLERGIES  Allergies   Allergen Reactions    Atorvastatin Other     Increased LFTS    Lovastatin Other     Increased LFTS    Nitrofurantoin Monohyd/M-Cryst Unknown    Sulfamethoxazole-Trimethoprim Unknown       MEDICATIONS  Current Outpatient Medications   Medication Sig Dispense Refill    allopurinol (Zyloprim) 300 mg tablet Take 1 tablet (300 mg) by mouth once daily. 90 tablet 3    ALPRAZolam (Xanax) 0.25 mg tablet Take 1 tablet (0.25 mg) by mouth 3 times a day as needed for anxiety. 90 tablet 1    ascorbic acid (Vitamin C) 500 mg tablet Take 1 tablet (500 mg) by mouth 2 times a day. With iron tablet      aspirin 81 mg chewable tablet Chew 1 tablet (81 mg) once daily.      blood sugar diagnostic (OneTouch Verio test strips) strip use daily as directed 100 each 3    cholecalciferol, vitamin D3, (VITAMIN D3 ORAL) Take 1 capsule by mouth 2 times a week.      colesevelam (Welchol) 625 mg tablet Take 3 tablets (1,875 mg) by mouth 2 times daily (morning and late afternoon).      diclofenac sodium (Voltaren) 1 % gel gel Apply topically 3 times a day. (Patient taking differently: Apply topically 2 times a day as needed.)      dilTIAZem CD (Cardizem CD) 180 mg 24 hr capsule Take 1 capsule (180 mg) by mouth once daily.  90 capsule 3    empagliflozin (Jardiance) 25 mg Take 1 tablet (25 mg) by mouth once daily. 90 tablet 3    ezetimibe (Zetia) 10 mg tablet Take 1 tablet (10 mg) by mouth once daily at bedtime.      furosemide (Lasix) 20 mg tablet Take 0.5 tablets (10 mg) by mouth once daily. (Patient taking differently: Take 0.5 tablets (10 mg) by mouth if needed.)      gabapentin (Neurontin) 100 mg capsule Take 1 capsule (100 mg) by mouth 3 times a day. 90 capsule 3    glimepiride (Amaryl) 2 mg tablet Take 1 tablet (2 mg) by mouth once daily in the morning. Take before meals. 90 tablet 3    iron polysaccharides (Nu-Iron,Niferex) 150 mg iron capsule Take 1 capsule (150 mg) by mouth once daily.      lancets (OneTouch Delica Plus Lancet) 33 gauge misc use daily 100 each 3    lancets misc once daily.      metFORMIN (Glucophage) 500 mg tablet Take 1 tablet (500 mg) by mouth 2 times daily (morning and late afternoon). 180 tablet 3    metoprolol tartrate (Lopressor) 50 mg tablet Take 1 tablet by mouth 2 times a day. 180 tablet 3    pantoprazole (ProtoNix) 40 mg EC tablet TAKE ONE TABLET BY MOUTH TWO TIMES A  tablet 3    sertraline (Zoloft) 50 mg tablet TAKE ONE TABLET BY MOUTH DAILY 90 tablet 3    valACYclovir (Valtrex) 1 gram tablet Take 1 tablet (1,000 mg) by mouth 3 times a day for 7 days. 21 tablet 0    warfarin (Coumadin) 5 mg tablet daily 90 tablet 3    warfarin (Jantoven) 2.5 mg tablet Take 1 tablet (2.5 mg) by mouth 3 times a week. 1 tab(s) orally once a day 3 days a week (mon,  weds, friday, )       No current facility-administered medications for this visit.         PAST HISTORY     PAST MEDICAL HISTORY  Past Medical History:   Diagnosis Date    Anxiety     Depression     Diabetes mellitus (Multi)     Essential (primary) hypertension 02/23/2022    Hypertension    GERD (gastroesophageal reflux disease)     Unspecified atrial fibrillation (Multi) 01/03/2023    Atrial fibrillation       PAST SURGICAL HISTORY:  Past Surgical  "History:   Procedure Laterality Date    MASTECTOMY  09/11/2015    Breast Surgery Mastectomy    MR HEAD ANGIO WO IV CONTRAST  4/10/2019    MR HEAD ANGIO WO IV CONTRAST 4/10/2019 [facility]    MR NECK ANGIO WO IV CONTRAST  4/10/2019    MR NECK ANGIO WO IV CONTRAST 4/10/2019 [facility]       FAMILY HISTORY  Family History   Problem Relation Name Age of Onset    Heart attack Mother      Heart disease Mother      Hypertension Father      Hypertension Brother      Heart attack Brother      Cancer Maternal Grandmother         SOCIAL HISTORY  She  reports that she has never smoked. She has never been exposed to tobacco smoke. She has never used smokeless tobacco. She reports that she does not drink alcohol and does not use drugs. She currently lives with her .     Caffeine consumption:   Alcohol consumption:   Smoking:   Marijuana:     PHYSICAL EXAM     VITAL SIGNS: /88 (BP Location: Left arm, Patient Position: Sitting, BP Cuff Size: Large adult)   Pulse 64   Ht 1.626 m (5' 4\")   Wt 97.1 kg (214 lb)   SpO2 95%   BMI 36.73 kg/m²      PREVIOUS WEIGHTS:  Wt Readings from Last 3 Encounters:   11/14/24 97.1 kg (214 lb)   11/13/24 98.2 kg (216 lb 6.4 oz)   09/11/24 99.4 kg (219 lb 3.2 oz)       Physical Exam  Physical Exam   Constitutional: Alert and oriented, cooperative, no obvious distress   HEENT: Non icteric or anemic, EOM WNL bilaterally   Neck: Supple, no JVD, no goiter, no adenopathy, no rigidity  Chest: CTA bilaterally, no wheezing, crackles, rubs   Cardiac: RRR, S1 and S2, no murmur, rub, thrill   Abdomen: Obese, Soft, nontender, no masses, no organomegaly   Extremities: No clubbing, no LL edema   Neuromuscular: Cranial nerves grossly intact, no focal deficits    RESULTS/DATA     Bicarbonate   Date Value   06/19/2024 27 mmol/L   02/15/2024 26 mmol/L   09/13/2023 25 mmol/L   08/15/2023 26 mmol/L   08/10/2023 CANCELED     Iron   Date Value   06/19/2024 17 ug/dL (L)   05/22/2023 74 " ug/dL   02/08/2023 36 UG/DL   08/08/2022 84 UG/DL     % Saturation (%)   Date Value   06/19/2024 4 (L)     Iron Saturation (%)   Date Value   05/22/2023 16 (L)   02/08/2023 8.1 (L)   08/08/2022 20.0     TIBC   Date Value   06/19/2024 439 ug/dL   05/22/2023 470 ug/dL (H)   02/08/2023 444 UG/DL (H)   08/08/2022 420 UG/DL     Ferritin   Date Value   06/19/2024 17 ng/mL   05/22/2023 34 ug/L   05/12/2022 23 ug/L   03/11/2022 19 ug/L       No results found for this or any previous visit from the past 365 days.       Failed to redirect to the Timeline version of the [employer] SmartLink.          ASSESSMENT/PLAN     Ms. Chappell is a 79 y.o. female and She was referred to the [facility] for evaluation of TORSTEN    Problem List and Orders  Problem List Items Addressed This Visit             ICD-10-CM    Benign essential hypertension - Primary I10     BP not at goal today  Follow with PCP           Sleep apnea G47.30     Remote diagnosis  Has [employer] AirSense 1- through [employer], not registered to cloud system  Download reviewed today. High leak noted. APAP set for fixed at 14 cwp no EPR  -Adjusted to APAP 10-14 EPR of 3 to reduce leak. She was agreeable.  -Supply order updated for [employer]. Staff to fax over  -Follow up in March with Don          BMI 36.0-36.9,adult Z68.36     Weight loss recommended  Follow up with PCP for recommendations               Follow up in March

## 2024-11-14 NOTE — PROGRESS NOTES
Patient identification verified with 2 identifiers.    Location: [facility] - [address]. [place], [place] [address] [phone]     Referring Physician: DR. CHEN  Enrollment/ Re-enrollment date: 25   INR Goal: 2.0-3.0  INR monitoring is per [facility] protocol.  Anticoagulation Medication: warfarin  Indication: Atrial Fibrillation/Atrial Flutter    Subjective   Bleeding signs/symptoms: No    Bruising: No   Major bleeding event: No  Thrombosis signs/symptoms: No  Thromboembolic event: No  Missed doses: No  Extra doses: No  Medication changes: Yes  TODAY, PT WILL BE STARTING GABAPENTIN  FOR SHINGLES.  SHE STARTED 7 DAY COURSE OF VALTREX ON 24  Dietary changes: No  Change in health: No  Change in activity: No  Alcohol: No  Other concerns: No    Upcoming Procedures:  Does the Patient Have any upcoming procedures that require interruption in anticoagulation therapy? no  Does the patient require bridging? no      Anticoagulation Summary  As of 2024      INR goal:  2.0-3.0   TTR:  74.6% (1.1 y)   INR used for dosin.30 (2024)   Weekly warfarin total:  27.5 mg               Assessment/Plan   Therapeutic     1. New dose: no change    2. Next INR: 1 week      Education provided to patient during the visit:  Patient instructed to call in interim with questions, concerns and changes.   Patient educated on signs of bleeding/clotting.

## 2024-11-14 NOTE — ASSESSMENT & PLAN NOTE
Remote diagnosis  Has [employer] AirSense 1- through [facility], not registered to cloud system  Download reviewed today. High leak noted. APAP set for fixed at 14 cwp no EPR  -Adjusted to APAP 10-14 EPR of 3 to reduce leak. She was agreeable.  -Supply order updated for [facility]. Staff to fax over  -Follow up in March with Marko

## 2024-11-15 DIAGNOSIS — E78.2 MIXED HYPERLIPIDEMIA: ICD-10-CM

## 2024-11-15 RX ORDER — DILTIAZEM HYDROCHLORIDE 180 MG/1
180 CAPSULE, COATED, EXTENDED RELEASE ORAL DAILY
Qty: 90 CAPSULE | Refills: 3 | Status: SHIPPED | OUTPATIENT
Start: 2024-11-15

## 2024-11-21 ENCOUNTER — ANTICOAGULATION - WARFARIN VISIT (OUTPATIENT)
Dept: CARDIOLOGY | Facility: CLINIC | Age: 79
End: 2024-11-21
Payer: MEDICARE

## 2024-11-21 ENCOUNTER — APPOINTMENT (OUTPATIENT)
Dept: CARDIOLOGY | Facility: CLINIC | Age: 79
End: 2024-11-21
Payer: MEDICARE

## 2024-11-21 DIAGNOSIS — I48.11 LONGSTANDING PERSISTENT ATRIAL FIBRILLATION (MULTI): Primary | ICD-10-CM

## 2024-11-21 LAB
POC INR: 2.5
POC PROTHROMBIN TIME: NORMAL

## 2024-11-21 PROCEDURE — 85610 PROTHROMBIN TIME: CPT | Mod: QW

## 2024-11-21 PROCEDURE — 99211 OFF/OP EST MAY X REQ PHY/QHP: CPT

## 2024-11-21 NOTE — PROGRESS NOTES
Patient identification verified with 2 identifiers.    Location: [facility] - [address]. [place], [place] [address] [phone]     Referring Physician: DR. CHEN  Enrollment/ Re-enrollment date: 25   INR Goal: 2.0-3.0  INR monitoring is per [facility] protocol.  Anticoagulation Medication: warfarin  Indication: Atrial Fibrillation/Atrial Flutter    Subjective   Bleeding signs/symptoms: No    Bruising: No   Major bleeding event: No  Thrombosis signs/symptoms: No  Thromboembolic event: No  Missed doses: No  Extra doses: No  Medication changes: Yes  PT FINISHED VALTREX AND HAS BEEN TAKING GABAPENTIN  OCCASION.  Dietary changes: No  Change in health: No  Change in activity: No  Alcohol: No  Other concerns: No    Upcoming Procedures:  Does the Patient Have any upcoming procedures that require interruption in anticoagulation therapy? no  Does the patient require bridging? no      Anticoagulation Summary  As of 2024      INR goal:  2.0-3.0   TTR:  75.0% (1.1 y)   INR used for dosin.50 (2024)   Weekly warfarin total:  27.5 mg               Assessment/Plan   Therapeutic     1. New dose: no change    2. Next INR: 1 month      Education provided to patient during the visit:  Patient instructed to call in interim with questions, concerns and changes.   Patient educated on interactions between medications and warfarin.   Patient educated on signs of bleeding/clotting.

## 2024-12-10 DIAGNOSIS — E78.2 MIXED HYPERLIPIDEMIA: Primary | ICD-10-CM

## 2024-12-10 RX ORDER — EZETIMIBE 10 MG/1
10 TABLET ORAL DAILY
Qty: 90 TABLET | Refills: 0 | Status: SHIPPED | OUTPATIENT
Start: 2024-12-10

## 2024-12-17 ENCOUNTER — APPOINTMENT (OUTPATIENT)
Dept: PRIMARY CARE | Facility: CLINIC | Age: 79
End: 2024-12-17
Payer: MEDICARE

## 2024-12-19 ENCOUNTER — ANTICOAGULATION - WARFARIN VISIT (OUTPATIENT)
Dept: CARDIOLOGY | Facility: CLINIC | Age: 79
End: 2024-12-19
Payer: MEDICARE

## 2024-12-19 DIAGNOSIS — I48.11 LONGSTANDING PERSISTENT ATRIAL FIBRILLATION (MULTI): Primary | ICD-10-CM

## 2024-12-19 LAB
POC INR: 2.7
POC PROTHROMBIN TIME: NORMAL

## 2024-12-19 PROCEDURE — 85610 PROTHROMBIN TIME: CPT | Mod: QW

## 2024-12-19 PROCEDURE — 99211 OFF/OP EST MAY X REQ PHY/QHP: CPT

## 2024-12-28 DIAGNOSIS — E11.59 TYPE 2 DIABETES MELLITUS WITH OTHER CIRCULATORY COMPLICATION, WITHOUT LONG-TERM CURRENT USE OF INSULIN: ICD-10-CM

## 2024-12-30 RX ORDER — EMPAGLIFLOZIN 25 MG/1
25 TABLET, FILM COATED ORAL DAILY
Qty: 90 TABLET | Refills: 3 | Status: SHIPPED | OUTPATIENT
Start: 2024-12-30

## 2025-01-14 ENCOUNTER — TELEPHONE (OUTPATIENT)
Dept: PRIMARY CARE | Facility: CLINIC | Age: 80
End: 2025-01-14
Payer: MEDICARE

## 2025-01-14 NOTE — TELEPHONE ENCOUNTER
Patient stated she rolled out of bed last Thursday night. Hit left knee - leg on floor. Stated as of today, left knee swollen and ecchymotic. Pain level 3. Able to walk without difficulty. Stated left foot more swollen, right foot is fine. Using heat - cold - voltaren gel. Elevating when not walking. Inquired if anything else she should do, or needs any testing. Please advise.

## 2025-01-14 NOTE — TELEPHONE ENCOUNTER
Patient notified. Stated she will monitor it tonight and if it is no better, she will go to local [facility] tomorrow.

## 2025-01-16 ENCOUNTER — ANTICOAGULATION - WARFARIN VISIT (OUTPATIENT)
Dept: CARDIOLOGY | Facility: CLINIC | Age: 80
End: 2025-01-16
Payer: MEDICARE

## 2025-01-16 DIAGNOSIS — I48.11 LONGSTANDING PERSISTENT ATRIAL FIBRILLATION (MULTI): Primary | ICD-10-CM

## 2025-01-16 LAB
POC INR: 2.8
POC PROTHROMBIN TIME: NORMAL

## 2025-01-16 PROCEDURE — 99211 OFF/OP EST MAY X REQ PHY/QHP: CPT

## 2025-01-16 PROCEDURE — 85610 PROTHROMBIN TIME: CPT | Mod: QW

## 2025-01-20 ENCOUNTER — APPOINTMENT (OUTPATIENT)
Dept: PRIMARY CARE | Facility: CLINIC | Age: 80
End: 2025-01-20
Payer: MEDICARE

## 2025-02-11 ENCOUNTER — ANTICOAGULATION - WARFARIN VISIT (OUTPATIENT)
Dept: CARDIOLOGY | Facility: CLINIC | Age: 80
End: 2025-02-11
Payer: MEDICARE

## 2025-02-11 DIAGNOSIS — I48.91 ATRIAL FIBRILLATION, UNSPECIFIED TYPE (MULTI): Primary | ICD-10-CM

## 2025-02-12 ENCOUNTER — OFFICE VISIT (OUTPATIENT)
Dept: CARDIOLOGY | Facility: HOSPITAL | Age: 80
End: 2025-02-12
Payer: MEDICARE

## 2025-02-12 VITALS
HEART RATE: 64 BPM | HEIGHT: 65 IN | WEIGHT: 225 LBS | SYSTOLIC BLOOD PRESSURE: 140 MMHG | DIASTOLIC BLOOD PRESSURE: 80 MMHG | BODY MASS INDEX: 37.49 KG/M2

## 2025-02-12 DIAGNOSIS — Z95.2 S/P TAVR (TRANSCATHETER AORTIC VALVE REPLACEMENT): ICD-10-CM

## 2025-02-12 DIAGNOSIS — I48.91 ATRIAL FIBRILLATION, UNSPECIFIED TYPE (MULTI): Primary | ICD-10-CM

## 2025-02-12 LAB
ATRIAL RATE: 74 BPM
Q ONSET: 191 MS
QRS COUNT: 10 BEATS
QRS DURATION: 142 MS
QT INTERVAL: 478 MS
QTC CALCULATION(BAZETT): 493 MS
QTC FREDERICIA: 488 MS
R AXIS: -82 DEGREES
T AXIS: 13 DEGREES
T OFFSET: 430 MS
VENTRICULAR RATE: 64 BPM

## 2025-02-12 PROCEDURE — 93010 ELECTROCARDIOGRAM REPORT: CPT | Performed by: INTERNAL MEDICINE

## 2025-02-12 PROCEDURE — 1160F RVW MEDS BY RX/DR IN RCRD: CPT | Performed by: INTERNAL MEDICINE

## 2025-02-12 PROCEDURE — 1157F ADVNC CARE PLAN IN RCRD: CPT | Performed by: INTERNAL MEDICINE

## 2025-02-12 PROCEDURE — 1123F ACP DISCUSS/DSCN MKR DOCD: CPT | Performed by: INTERNAL MEDICINE

## 2025-02-12 PROCEDURE — 93005 ELECTROCARDIOGRAM TRACING: CPT | Performed by: INTERNAL MEDICINE

## 2025-02-12 PROCEDURE — 99214 OFFICE O/P EST MOD 30 MIN: CPT | Mod: 25 | Performed by: INTERNAL MEDICINE

## 2025-02-12 PROCEDURE — 1159F MED LIST DOCD IN RCRD: CPT | Performed by: INTERNAL MEDICINE

## 2025-02-12 PROCEDURE — 99214 OFFICE O/P EST MOD 30 MIN: CPT | Performed by: INTERNAL MEDICINE

## 2025-02-12 PROCEDURE — 3079F DIAST BP 80-89 MM HG: CPT | Performed by: INTERNAL MEDICINE

## 2025-02-12 PROCEDURE — 3077F SYST BP >= 140 MM HG: CPT | Performed by: INTERNAL MEDICINE

## 2025-02-12 PROCEDURE — 1036F TOBACCO NON-USER: CPT | Performed by: INTERNAL MEDICINE

## 2025-02-12 NOTE — PROGRESS NOTES
Subjective:  Kyleigh returns for a routine 6-month follow-up.  She is now about a year and half out following her TAVR for aortic stenosis.    She generally has been doing well.  She has not had any hospitalizations and denies any medication changes.  She did fall out of bed recently probably during a probable dream.  She traumatized her left knee, but this fortunately is healing up well.    She denies any fevers or chills.  She denies any palpitations, presyncope or syncope.  Her activity tolerance generally remains stable.  She overall is generally happy and comfortable with how she is doing.    Objective:  General: Alert, usual self.  HEENT: Unchanged.  Lungs: Clear with good air exchange and no crackles or wheezing.  Cardiac: Normal S1 and S2 with 1/6 systolic murmur.  Abdomen: Protuberant but nontender.  Extremities: Unchanged.  Skin: Scattered ecchymoses.  Neuro: Grossly intact.    EKG: Atrial fibrillation.  Right bundle branch block.    Lipid panel: Cholesterol-185, HDL-34, LDL-114, TG-165.    Impression/plan:  Patient generally remains clinically stable at this time.  Her TAVR valve appears to be functioning appropriately, and she is not having any concerning symptoms.  I will plan on seeing her back in 6 months, and we will do a routine surveillance echo at that time to keep an eye on her TAVR valve.    Her blood pressure remains under good control so we will continue her current medications unchanged.    Her atrial fibrillation rate control is good and she is not have any symptoms referable to this.  We will thus continue rate control and she remains appropriately anticoagulated with warfarin.  Her INR's have reportedly been in good range.    She did not need any prescriptions renewed.    Patient instructions:    Continue current medications unchanged.    Return to clinic in 6 months for follow-up office visit and repeat echocardiogram.

## 2025-02-13 ENCOUNTER — APPOINTMENT (OUTPATIENT)
Dept: CARDIOLOGY | Facility: CLINIC | Age: 80
End: 2025-02-13
Payer: MEDICARE

## 2025-02-14 ENCOUNTER — OFFICE VISIT (OUTPATIENT)
Dept: PRIMARY CARE | Facility: CLINIC | Age: 80
End: 2025-02-14
Payer: MEDICARE

## 2025-02-14 VITALS
OXYGEN SATURATION: 97 % | WEIGHT: 226 LBS | DIASTOLIC BLOOD PRESSURE: 60 MMHG | BODY MASS INDEX: 37.61 KG/M2 | HEART RATE: 54 BPM | SYSTOLIC BLOOD PRESSURE: 134 MMHG

## 2025-02-14 DIAGNOSIS — N18.4 CHRONIC RENAL DISEASE, STAGE IV (MULTI): ICD-10-CM

## 2025-02-14 DIAGNOSIS — Z95.2 S/P TAVR (TRANSCATHETER AORTIC VALVE REPLACEMENT): ICD-10-CM

## 2025-02-14 DIAGNOSIS — N28.9 NEPHROPATHY: ICD-10-CM

## 2025-02-14 DIAGNOSIS — K21.9 GASTROESOPHAGEAL REFLUX DISEASE WITHOUT ESOPHAGITIS: ICD-10-CM

## 2025-02-14 DIAGNOSIS — Z86.73 HISTORY OF RECURRENT TIAS: ICD-10-CM

## 2025-02-14 DIAGNOSIS — E11.59 TYPE 2 DIABETES MELLITUS WITH OTHER CIRCULATORY COMPLICATION, WITHOUT LONG-TERM CURRENT USE OF INSULIN: Primary | ICD-10-CM

## 2025-02-14 DIAGNOSIS — E55.9 VITAMIN D DEFICIENCY: ICD-10-CM

## 2025-02-14 DIAGNOSIS — F41.8 DEPRESSION WITH ANXIETY: ICD-10-CM

## 2025-02-14 DIAGNOSIS — E66.01 MORBID (SEVERE) OBESITY DUE TO EXCESS CALORIES (MULTI): ICD-10-CM

## 2025-02-14 DIAGNOSIS — I48.11 LONGSTANDING PERSISTENT ATRIAL FIBRILLATION (MULTI): ICD-10-CM

## 2025-02-14 DIAGNOSIS — E78.2 MIXED HYPERLIPIDEMIA: ICD-10-CM

## 2025-02-14 DIAGNOSIS — I10 BENIGN ESSENTIAL HYPERTENSION: ICD-10-CM

## 2025-02-14 DIAGNOSIS — G47.33 OBSTRUCTIVE SLEEP APNEA SYNDROME: ICD-10-CM

## 2025-02-14 DIAGNOSIS — M1A.09X0 IDIOPATHIC CHRONIC GOUT OF MULTIPLE SITES WITHOUT TOPHUS: ICD-10-CM

## 2025-02-14 LAB — POC HEMOGLOBIN A1C: 6.9 % (ref 4.2–6.5)

## 2025-02-14 PROCEDURE — 1126F AMNT PAIN NOTED NONE PRSNT: CPT | Performed by: INTERNAL MEDICINE

## 2025-02-14 PROCEDURE — 1157F ADVNC CARE PLAN IN RCRD: CPT | Performed by: INTERNAL MEDICINE

## 2025-02-14 PROCEDURE — 99214 OFFICE O/P EST MOD 30 MIN: CPT | Performed by: INTERNAL MEDICINE

## 2025-02-14 PROCEDURE — G2211 COMPLEX E/M VISIT ADD ON: HCPCS | Performed by: INTERNAL MEDICINE

## 2025-02-14 PROCEDURE — 1123F ACP DISCUSS/DSCN MKR DOCD: CPT | Performed by: INTERNAL MEDICINE

## 2025-02-14 PROCEDURE — 3078F DIAST BP <80 MM HG: CPT | Performed by: INTERNAL MEDICINE

## 2025-02-14 PROCEDURE — 3075F SYST BP GE 130 - 139MM HG: CPT | Performed by: INTERNAL MEDICINE

## 2025-02-14 PROCEDURE — 83036 HEMOGLOBIN GLYCOSYLATED A1C: CPT | Performed by: INTERNAL MEDICINE

## 2025-02-14 PROCEDURE — 1159F MED LIST DOCD IN RCRD: CPT | Performed by: INTERNAL MEDICINE

## 2025-02-14 PROCEDURE — 1036F TOBACCO NON-USER: CPT | Performed by: INTERNAL MEDICINE

## 2025-02-14 RX ORDER — PANTOPRAZOLE SODIUM 40 MG/1
40 TABLET, DELAYED RELEASE ORAL 2 TIMES DAILY
Qty: 180 TABLET | Refills: 3 | Status: SHIPPED | OUTPATIENT
Start: 2025-02-14

## 2025-02-14 ASSESSMENT — ENCOUNTER SYMPTOMS
CARDIOVASCULAR NEGATIVE: 1
SHORTNESS OF BREATH: 0
ARTHRALGIAS: 1
DYSURIA: 0
WEAKNESS: 1
ACTIVITY CHANGE: 0
CONSTITUTIONAL NEGATIVE: 1
LOSS OF SENSATION IN FEET: 0
DIARRHEA: 0
OCCASIONAL FEELINGS OF UNSTEADINESS: 0
DEPRESSION: 0
CONSTIPATION: 0
COUGH: 0
ABDOMINAL PAIN: 0
PSYCHIATRIC NEGATIVE: 1

## 2025-02-14 ASSESSMENT — PAIN SCALES - GENERAL: PAINLEVEL_OUTOF10: 0-NO PAIN

## 2025-02-14 NOTE — PROGRESS NOTES
Subjective   Patient ID: Kyleigh Chappell is a 79 y.o. female who presents for Follow-up.    Atrial Fibrillation--stable on meds-remains on coumadin-adjusted by cardio. 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  Sugars at home: 120-150, high if eats too many carbs.  Lab Results       Component                Value               Date                       HGBA1C                   7.3 (A)             09/11/2024                                             6.9                     2/14/2025                                                                                                 GERD-stable on  protonix    Gout-- On allopurinol. Last flare after lobster  Lab Results       Component                Value               Date                       URICACID                 4.1                 06/19/2024               Depression with anxiety---fairly stable w/ sertraline and needing more xanax with  and homeless granddtr living with her-not using daily.  Econsent signed 9/11/2024.  [place]ARRS reviewed 2/14/2025    CRF-29 last eGFR 11/2024 ; now anemic with low iron and taking oral iron--managed by Dr Lamas    Sleep apnea-uses cpap nightly--feels bad without it    H/o recurrant TIA's-no symptoms since on coumadin    Exercise-- rec center-water exercises, floor exercises       Diet tries to watch carbs and fat-moved to small plates    Dr Lamas-added iron daily for anemia           Review of Systems   Constitutional: Negative.  Negative for activity change.        Slowly losing weight   HENT:          Needs tooth pulled next week   Respiratory:  Negative for cough and shortness of breath.    Cardiovascular: Negative.         Able to exercise without difficulty   Gastrointestinal:  Negative for abdominal pain, constipation and diarrhea.   Genitourinary:  Negative for dysuria.   Musculoskeletal:  Positive for  arthralgias.        Using voltaren gel when has pain; rolled out of bed in her sleep and hurt left knee-now back to normal   Skin:  Negative for rash.   Neurological:  Positive for weakness (legs already stronger with exercise).   Psychiatric/Behavioral: Negative.          Caring for nabeel-needs daily treatment for lymph edema;  up and down--varies day to day       Objective   /60 (BP Location: Left arm, Patient Position: Sitting)   Pulse 54   Wt 103 kg (226 lb)   SpO2 97%   BMI 37.61 kg/m²     Physical Exam  Constitutional:       General: She is not in acute distress.     Appearance: Normal appearance.   Cardiovascular:      Rate and Rhythm: Normal rate. Rhythm irregular.      Heart sounds: Murmur heard.      No gallop.   Pulmonary:      Breath sounds: Normal breath sounds. No wheezing, rhonchi or rales.   Skin:     General: Skin is warm and dry.      Findings: No rash.   Neurological:      General: No focal deficit present.      Mental Status: She is alert and oriented to person, place, and time.   Psychiatric:         Mood and Affect: Mood normal.      Comments: Frustrated with nabeel       Assessment/Plan   Diagnoses and all orders for this visit:  Type 2 diabetes mellitus with other circulatory complication, without long-term current use of insulin  -     POCT glycosylated hemoglobin (Hb A1C) manually resulted  Longstanding persistent atrial fibrillation (Multi)  Benign essential hypertension  Mixed hyperlipidemia  S/P TAVR (transcatheter aortic valve replacement)  Morbid (severe) obesity due to excess calories (Multi)  Vitamin D deficiency  Gastroesophageal reflux disease without esophagitis  Chronic renal disease, stage IV (Multi)  Depression with anxiety  Nephropathy  Idiopathic chronic gout of multiple sites without tophus  History of recurrent TIAs  Obstructive sleep apnea syndrome  Other orders  -     Follow Up In Primary Care - Established      Current Outpatient Medications:     allopurinol  (Zyloprim) 300 mg tablet, Take 1 tablet (300 mg) by mouth once daily., Disp: 90 tablet, Rfl: 3    ALPRAZolam (Xanax) 0.25 mg tablet, Take 1 tablet (0.25 mg) by mouth 3 times a day as needed for anxiety., Disp: 90 tablet, Rfl: 1    ascorbic acid (Vitamin C) 500 mg tablet, Take 1 tablet (500 mg) by mouth 2 times a day. With iron tablet, Disp: , Rfl:     aspirin 81 mg chewable tablet, Chew 1 tablet (81 mg) once daily., Disp: , Rfl:     blood sugar diagnostic (OneTouch Verio test strips) strip, use daily as directed, Disp: 100 each, Rfl: 3    cholecalciferol, vitamin D3, (VITAMIN D3 ORAL), Take 1 capsule by mouth 2 times a week., Disp: , Rfl:     colesevelam (Welchol) 625 mg tablet, Take 3 tablets (1,875 mg) by mouth 2 times daily (morning and late afternoon)., Disp: , Rfl:     diclofenac sodium (Voltaren) 1 % gel gel, Apply topically 3 times a day. (Patient taking differently: Apply topically 2 times a day as needed.), Disp: , Rfl:     dilTIAZem CD (Cardizem CD) 180 mg 24 hr capsule, TAKE ONE CAPSULE BY MOUTH EVERY DAY, Disp: 90 capsule, Rfl: 3    empagliflozin (Jardiance) 25 mg, take 1 tablet by mouth once daily, Disp: 90 tablet, Rfl: 3    ezetimibe (Zetia) 10 mg tablet, TAKE ONE TABLET BY MOUTH EVERY DAY, Disp: 90 tablet, Rfl: 0    furosemide (Lasix) 20 mg tablet, Take 0.5 tablets (10 mg) by mouth once daily. (Patient taking differently: Take 0.5 tablets (10 mg) by mouth if needed.), Disp: , Rfl:     gabapentin (Neurontin) 100 mg capsule, Take 1 capsule (100 mg) by mouth 3 times a day. (Patient taking differently: Take 1 capsule (100 mg) by mouth 2 times a day.), Disp: 90 capsule, Rfl: 3    glimepiride (Amaryl) 2 mg tablet, Take 1 tablet (2 mg) by mouth once daily in the morning. Take before meals., Disp: 90 tablet, Rfl: 3    iron polysaccharides (Nu-Iron,Niferex) 150 mg iron capsule, Take 1 capsule (150 mg) by mouth once daily., Disp: , Rfl:     lancets (OneTouch Delica Plus Lancet) 33 gauge misc, use daily, Disp:  100 each, Rfl: 3    lancets misc, once daily., Disp: , Rfl:     metFORMIN (Glucophage) 500 mg tablet, Take 1 tablet (500 mg) by mouth 2 times daily (morning and late afternoon)., Disp: 180 tablet, Rfl: 3    metoprolol tartrate (Lopressor) 50 mg tablet, Take 1 tablet by mouth 2 times a day., Disp: 180 tablet, Rfl: 3    sertraline (Zoloft) 50 mg tablet, TAKE ONE TABLET BY MOUTH DAILY, Disp: 90 tablet, Rfl: 3    warfarin (Coumadin) 5 mg tablet, daily, Disp: 90 tablet, Rfl: 3    warfarin (Jantoven) 2.5 mg tablet, Take 1 tablet (2.5 mg) by mouth 3 times a week. 1 tab(s) orally once a day 3 days a week (mon,  weds, friday, ), Disp: , Rfl:     pantoprazole (ProtoNix) 40 mg EC tablet, Take 1 tablet (40 mg) by mouth 2 times a day., Disp: 180 tablet, Rfl: 3

## 2025-02-17 ENCOUNTER — ANTICOAGULATION - WARFARIN VISIT (OUTPATIENT)
Dept: CARDIOLOGY | Facility: CLINIC | Age: 80
End: 2025-02-17
Payer: MEDICARE

## 2025-02-17 DIAGNOSIS — I48.11 LONGSTANDING PERSISTENT ATRIAL FIBRILLATION (MULTI): ICD-10-CM

## 2025-02-17 LAB
POC INR: 2.9
POC PROTHROMBIN TIME: NORMAL

## 2025-02-17 PROCEDURE — 85610 PROTHROMBIN TIME: CPT | Mod: QW

## 2025-02-17 PROCEDURE — 99211 OFF/OP EST MAY X REQ PHY/QHP: CPT

## 2025-02-17 NOTE — PROGRESS NOTES
Patient identification verified with 2 identifiers.    Location: [facility] - [address]. [place], [place] [address] [phone]     Referring Physician: Dr Mandujano  Enrollment/ Re-enrollment date: 25   INR Goal: 2.0-3.0  INR monitoring is per [facility] protocol.  Anticoagulation Medication: warfarin  Indication: Atrial Fibrillation/Atrial Flutter    Subjective   Bleeding signs/symptoms: No    Bruising: No   Major bleeding event: No  Thrombosis signs/symptoms: No  Thromboembolic event: No  Missed doses: No  Extra doses: No  Medication changes: No  Dietary changes: No  Change in health: No  Change in activity: No  Alcohol: No  Other concerns: No    Upcoming Procedures:  Does the Patient Have any upcoming procedures that require interruption in anticoagulation therapy? no  Does the patient require bridging? no      Anticoagulation Summary  As of 2025      INR goal:  2.0-3.0   TTR:  79.4% (1.4 y)   INR used for dosin.90 (2025)   Weekly warfarin total:  27.5 mg               Assessment/Plan   Therapeutic     1. New dose: no change    2. Next INR: 1 month      Education provided to patient during the visit:  Patient instructed to call in interim with questions, concerns and changes.   Patient educated on interactions between medications and warfarin.   Patient educated on dietary consistency in vitamin k consumption.   Patient educated on affects of alcohol consumption while taking warfarin.   Patient educated on signs of bleeding/clotting.   Patient educated on compliance with dosing, follow up appointments, and prescribed plan of care.

## 2025-02-19 ENCOUNTER — TELEPHONE (OUTPATIENT)
Dept: PRIMARY CARE | Facility: CLINIC | Age: 80
End: 2025-02-19
Payer: MEDICARE

## 2025-02-19 NOTE — TELEPHONE ENCOUNTER
Patient had a tooth extraction yesterday. Was given prescription for tramadol 50mg. Patient wants to know if she can take that with her xanex and sertraline. Please advise [phone].

## 2025-03-20 ENCOUNTER — OFFICE VISIT (OUTPATIENT)
Dept: SLEEP MEDICINE | Facility: CLINIC | Age: 80
End: 2025-03-20
Payer: MEDICARE

## 2025-03-20 ENCOUNTER — ANTICOAGULATION - WARFARIN VISIT (OUTPATIENT)
Dept: CARDIOLOGY | Facility: CLINIC | Age: 80
End: 2025-03-20
Payer: MEDICARE

## 2025-03-20 VITALS
BODY MASS INDEX: 37.61 KG/M2 | HEIGHT: 65 IN | SYSTOLIC BLOOD PRESSURE: 153 MMHG | DIASTOLIC BLOOD PRESSURE: 79 MMHG | HEART RATE: 57 BPM | OXYGEN SATURATION: 96 %

## 2025-03-20 DIAGNOSIS — I10 BENIGN ESSENTIAL HYPERTENSION: ICD-10-CM

## 2025-03-20 DIAGNOSIS — I48.11 LONGSTANDING PERSISTENT ATRIAL FIBRILLATION (MULTI): Primary | ICD-10-CM

## 2025-03-20 DIAGNOSIS — G47.33 OSA (OBSTRUCTIVE SLEEP APNEA): Primary | ICD-10-CM

## 2025-03-20 PROBLEM — G47.30 SLEEP APNEA: Status: RESOLVED | Noted: 2023-09-14 | Resolved: 2025-03-20

## 2025-03-20 LAB
POC INR: 2.1
POC PROTHROMBIN TIME: NORMAL

## 2025-03-20 PROCEDURE — 1159F MED LIST DOCD IN RCRD: CPT | Performed by: NURSE PRACTITIONER

## 2025-03-20 PROCEDURE — 3078F DIAST BP <80 MM HG: CPT | Performed by: NURSE PRACTITIONER

## 2025-03-20 PROCEDURE — 99214 OFFICE O/P EST MOD 30 MIN: CPT | Performed by: NURSE PRACTITIONER

## 2025-03-20 PROCEDURE — 1160F RVW MEDS BY RX/DR IN RCRD: CPT | Performed by: NURSE PRACTITIONER

## 2025-03-20 PROCEDURE — 85610 PROTHROMBIN TIME: CPT | Mod: QW

## 2025-03-20 PROCEDURE — 1123F ACP DISCUSS/DSCN MKR DOCD: CPT | Performed by: NURSE PRACTITIONER

## 2025-03-20 PROCEDURE — 1126F AMNT PAIN NOTED NONE PRSNT: CPT | Performed by: NURSE PRACTITIONER

## 2025-03-20 PROCEDURE — 1036F TOBACCO NON-USER: CPT | Performed by: NURSE PRACTITIONER

## 2025-03-20 PROCEDURE — 3077F SYST BP >= 140 MM HG: CPT | Performed by: NURSE PRACTITIONER

## 2025-03-20 PROCEDURE — G2211 COMPLEX E/M VISIT ADD ON: HCPCS | Performed by: NURSE PRACTITIONER

## 2025-03-20 PROCEDURE — 99211 OFF/OP EST MAY X REQ PHY/QHP: CPT

## 2025-03-20 PROCEDURE — 1157F ADVNC CARE PLAN IN RCRD: CPT | Performed by: NURSE PRACTITIONER

## 2025-03-20 ASSESSMENT — SLEEP AND FATIGUE QUESTIONNAIRES
HOW LIKELY ARE YOU TO NOD OFF OR FALL ASLEEP WHILE SITTING AND TALKING TO SOMEONE: WOULD NEVER DOZE
SATISFACTION_WITH_CURRENT_SLEEP_PATTERN: VERY SATISFIED
HOW LIKELY ARE YOU TO NOD OFF OR FALL ASLEEP WHEN YOU ARE A PASSENGER IN A CAR FOR AN HOUR WITHOUT A BREAK: WOULD NEVER DOZE
ESS-CHAD TOTAL SCORE: 2
HOW LIKELY ARE YOU TO NOD OFF OR FALL ASLEEP WHILE LYING DOWN TO REST IN THE AFTERNOON WHEN CIRCUMSTANCES PERMIT: MODERATE CHANCE OF DOZING
HOW LIKELY ARE YOU TO NOD OFF OR FALL ASLEEP WHILE SITTING QUIETLY AFTER LUNCH WITHOUT ALCOHOL: WOULD NEVER DOZE
DIFFICULTY_FALLING_ASLEEP: MODERATE
HOW LIKELY ARE YOU TO NOD OFF OR FALL ASLEEP WHILE SITTING AND READING: WOULD NEVER DOZE
HOW LIKELY ARE YOU TO NOD OFF OR FALL ASLEEP WHILE WATCHING TV: WOULD NEVER DOZE
SITING INACTIVE IN A PUBLIC PLACE LIKE A CLASS ROOM OR A MOVIE THEATER: WOULD NEVER DOZE
SLEEP_PROBLEM_NOTICEABLE_TO_OTHERS: A LITTLE
DIFFICULTY_STAYING_ASLEEP: MODERATE
WORRIED_DISTRESSED_DUE_TO_SLEEP: NOT AT ALL NOTICEABLE
HOW LIKELY ARE YOU TO NOD OFF OR FALL ASLEEP IN A CAR, WHILE STOPPED FOR A FEW MINUTES IN TRAFFIC: WOULD NEVER DOZE
SLEEP_PROBLEM_INTERFERES_DAILY_ACTIVITIES: A LITTLE

## 2025-03-20 ASSESSMENT — ENCOUNTER SYMPTOMS
OCCASIONAL FEELINGS OF UNSTEADINESS: 0
LOSS OF SENSATION IN FEET: 0
DEPRESSION: 0

## 2025-03-20 ASSESSMENT — COLUMBIA-SUICIDE SEVERITY RATING SCALE - C-SSRS
2. HAVE YOU ACTUALLY HAD ANY THOUGHTS OF KILLING YOURSELF?: NO
6. HAVE YOU EVER DONE ANYTHING, STARTED TO DO ANYTHING, OR PREPARED TO DO ANYTHING TO END YOUR LIFE?: NO
1. IN THE PAST MONTH, HAVE YOU WISHED YOU WERE DEAD OR WISHED YOU COULD GO TO SLEEP AND NOT WAKE UP?: NO

## 2025-03-20 ASSESSMENT — PAIN SCALES - GENERAL: PAINLEVEL_OUTOF10: 0-NO PAIN

## 2025-03-20 NOTE — PROGRESS NOTES
Patient: Kyleigh Chappell    [id number]  : 1945 -- AGE 79 y.o.    Provider: JOSE ARMANDO Jaffe     Location [facility]   Service Date: 3/20/2025              [facility]  Follow Up Visit Note      HISTORY OF PRESENT ILLNESS       HISTORY OF PRESENT ILLNESS   Kyleigh Chappell is a 79 y.o. female who presents to a [facility] for a sleep medicine evaluation with concerns of TORSTEN. She is accompanied by her  Marko who I saw in concurrently today.     The patient has h/o  has a past medical history of Anxiety, Depression, Diabetes mellitus (Multi), Essential (primary) hypertension (2022), GERD (gastroesophageal reflux disease), and Unspecified atrial fibrillation (Multi) (2023)..    Past Sleep History  Patient has had sleep testing in the past. No record available today in clinic.     Current History    On today's visit, the patient reports wearing CPAP faithfully since diagnosis. Notes she was falling asleep driving, was not functioning well. Original setting on 4 was too low. Was having trouble tolerating machine, benefiting from machine. States she cannot sleep without it now. She will often put it on when she lays down to rest in case she fall asleep. Notes she has never had someone monitor her machine. Was interested in establishing care. Using [employer] for supplies. Getting equipment regularly. Wanted to know if her air pressure settings were correct. Notes she does not open her mouth when she is sleeping. Occasionally has trouble falling asleep. Attributes to rumination. Notes it does not bother her has her life allows for flexibility in her sleep patterns.     Sleep schedule  on weekdays / work days:  Usual Bedtime:    11 PM  Falls asleep around:    # of awakenings:   Wake time:  9 AM- sometimes more    Naps: sometimes 1 hour if she did not sleep well     Preferred sleeping position:  back, side     Sleep-related ROS:    Problems going to sleep: rumination/thoughts/worries    Problems staying asleep Problems Staying Asleep: nocturia    Breathing during sleep: no symptoms of snoring or concerns of breathing at night with CPAP    Daytime Symptoms  On awakening patient reports: no morning symptoms    RLS screen:  RLSSCREEN: - Sensations: Patient does not have unusual sensations in their extremities that cause an urge to move them     Sleep-related behaviors:  denies     Fatigue: denies     ESS: 2   MELQUIADES: 6  FOSQ:  36      REVIEW OF SYSTEMS     REVIEW OF SYSTEMS  Review of Systems   All other systems reviewed and are negative.      ALLERGIES AND MEDICATIONS     ALLERGIES  Allergies   Allergen Reactions    Atorvastatin Other     Increased LFTS    Lovastatin Other     Increased LFTS    Nitrofurantoin Monohyd/M-Cryst Unknown    Sulfamethoxazole-Trimethoprim Unknown       MEDICATIONS  Current Outpatient Medications   Medication Sig Dispense Refill    allopurinol (Zyloprim) 300 mg tablet Take 1 tablet (300 mg) by mouth once daily. 90 tablet 3    ALPRAZolam (Xanax) 0.25 mg tablet Take 1 tablet (0.25 mg) by mouth 3 times a day as needed for anxiety. 90 tablet 1    ascorbic acid (Vitamin C) 500 mg tablet Take 1 tablet (500 mg) by mouth 2 times a day. With iron tablet      aspirin 81 mg chewable tablet Chew 1 tablet (81 mg) once daily.      blood sugar diagnostic (OneTouch Verio test strips) strip use daily as directed 100 each 3    cholecalciferol, vitamin D3, (VITAMIN D3 ORAL) Take 1 capsule by mouth 2 times a week.      colesevelam (Welchol) 625 mg tablet Take 3 tablets (1,875 mg) by mouth 2 times daily (morning and late afternoon).      diclofenac sodium (Voltaren) 1 % gel gel Apply topically 3 times a day. (Patient taking differently: Apply topically 2 times a day as needed.)      dilTIAZem CD (Cardizem CD) 180 mg 24 hr capsule TAKE ONE CAPSULE BY MOUTH EVERY DAY 90 capsule 3    empagliflozin (Jardiance)  25 mg take 1 tablet by mouth once daily 90 tablet 3    ezetimibe (Zetia) 10 mg tablet TAKE ONE TABLET BY MOUTH EVERY DAY 90 tablet 0    furosemide (Lasix) 20 mg tablet Take 0.5 tablets (10 mg) by mouth once daily. (Patient taking differently: Take 0.5 tablets (10 mg) by mouth if needed.)      glimepiride (Amaryl) 2 mg tablet Take 1 tablet (2 mg) by mouth once daily in the morning. Take before meals. 90 tablet 3    iron polysaccharides (Nu-Iron,Niferex) 150 mg iron capsule Take 1 capsule (150 mg) by mouth once daily.      lancets (OneTouch Delica Plus Lancet) 33 gauge misc use daily 100 each 3    lancets misc once daily.      metFORMIN (Glucophage) 500 mg tablet Take 1 tablet (500 mg) by mouth 2 times daily (morning and late afternoon). 180 tablet 3    metoprolol tartrate (Lopressor) 50 mg tablet Take 1 tablet by mouth 2 times a day. 180 tablet 3    pantoprazole (ProtoNix) 40 mg EC tablet Take 1 tablet (40 mg) by mouth 2 times a day. 180 tablet 3    sertraline (Zoloft) 50 mg tablet TAKE ONE TABLET BY MOUTH DAILY 90 tablet 3    warfarin (Coumadin) 5 mg tablet daily 90 tablet 3    warfarin (Jantoven) 2.5 mg tablet Take 1 tablet (2.5 mg) by mouth 3 times a week. 1 tab(s) orally once a day 3 days a week (mon,  weds, friday, )       No current facility-administered medications for this visit.         PAST HISTORY     PAST MEDICAL HISTORY  Past Medical History:   Diagnosis Date    Anxiety     Depression     Diabetes mellitus (Multi)     Essential (primary) hypertension 02/23/2022    Hypertension    GERD (gastroesophageal reflux disease)     Unspecified atrial fibrillation (Multi) 01/03/2023    Atrial fibrillation       PAST SURGICAL HISTORY:  Past Surgical History:   Procedure Laterality Date    MASTECTOMY  09/11/2015    Breast Surgery Mastectomy    MR HEAD ANGIO WO IV CONTRAST  4/10/2019    MR HEAD ANGIO WO IV CONTRAST 4/10/2019 [facility] EMERGENCY LEGACY    MR NECK ANGIO WO IV CONTRAST  4/10/2019    MR NECK ANGIO WO IV  "CONTRAST 4/10/2019 [facility] EMERGENCY LEGAC[facility]       FAMILY HISTORY  Family History   Problem Relation Name Age of Onset    Heart attack Mother      Heart disease Mother      Hypertension Father      Hypertension Brother      Heart attack Brother      Cancer Maternal Grandmother         SOCIAL HISTORY  She  reports that she has never smoked. She has never been exposed to tobacco smoke. She has never used smokeless tobacco. She reports that she does not drink alcohol and does not use drugs. She currently lives with her .     Caffeine consumption:   Alcohol consumption:   Smoking:   Marijuana:     PHYSICAL EXAM     VITAL SIGNS: /79 (BP Location: Left arm, Patient Position: Sitting, BP Cuff Size: Large adult)   Pulse 57   Ht 1.651 m (5' 5\")   SpO2 96%   BMI 37.61 kg/m²      PREVIOUS WEIGHTS:  Wt Readings from Last 3 Encounters:   02/14/25 103 kg (226 lb)   02/12/25 102 kg (225 lb)   11/14/24 97.1 kg (214 lb)       Physical Exam  Physical Exam   Constitutional: Alert and oriented, cooperative, no obvious distress   HEENT: Non icteric or anemic, EOM WNL bilaterally   Neck: Supple, no JVD, no goiter, no adenopathy, no rigidity  Chest: CTA bilaterally, no wheezing, crackles, rubs   Cardiac: RRR, S1 and S2, no murmur, rub, thrill   Abdomen: Obese, Soft, nontender, no masses, no organomegaly   Extremities: No clubbing, no LL edema   Neuromuscular: Cranial nerves grossly intact, no focal deficits    RESULTS/DATA     Bicarbonate (mmol/L)   Date Value   11/14/2024 26   06/19/2024 27   02/15/2024 26     Iron   Date Value   06/19/2024 17 ug/dL (L)   05/22/2023 74 ug/dL   02/08/2023 36 UG/DL   08/08/2022 84 UG/DL     % Saturation (%)   Date Value   06/19/2024 4 (L)     Iron Saturation (%)   Date Value   05/22/2023 16 (L)   02/08/2023 8.1 (L)   08/08/2022 20.0     TIBC   Date Value   06/19/2024 439 ug/dL   05/22/2023 470 ug/dL (H)   02/08/2023 444 UG/DL (H)   08/08/2022 420 UG/DL     Ferritin   Date Value   06/19/2024 17 " ng/mL   05/22/2023 34 ug/L   05/12/2022 23 ug/L   03/11/2022 19 ug/L       No results found for this or any previous visit from the past 365 days.       Failed to redirect to the Timeline version of the REVFS SmartLink.          ASSESSMENT/PLAN     Ms. Chappell is a 79 y.o. female and She was referred to the [facility] for evaluation of TORSTEN    Problem List and Orders  Problem List Items Addressed This Visit             ICD-10-CM    Benign essential hypertension I10     BP not at goal today  Follow with PCP           BMI 36.0-36.9,adult Z68.36     Weight loss recommended  Follow up with PCP for recommendations           TORSTEN (obstructive sleep apnea) - Primary G47.33     Remote diagnosis  -Well controlled on current settings  -continue current PAP settings  -Supply order updated today  -RTC 12 mo or sooner if needed            Relevant Orders    Positive Airway Pressure (PAP) Therapy        Follow up in 12 months

## 2025-03-20 NOTE — ASSESSMENT & PLAN NOTE
Remote diagnosis  -Well controlled on current settings  -continue current PAP settings  -Supply order updated today  -RTC 12 mo or sooner if needed

## 2025-03-20 NOTE — PROGRESS NOTES
Patient identification verified with 2 identifiers.    Location: [facility] - [address]. [place], [place] [address] [phone]     Referring Physician: DR. CHEN  Enrollment/ Re-enrollment date: 25   INR Goal: 2.0-3.0  INR monitoring is per [facility] protocol.  Anticoagulation Medication: warfarin  Indication: Atrial Fibrillation/Atrial Flutter    Subjective   Bleeding signs/symptoms: No    Bruising: No   Major bleeding event: No  Thrombosis signs/symptoms: No  Thromboembolic event: No  Missed doses: No  Extra doses: No  Medication changes: Yes  PT HAS BEEN TAKING TYLENOL 4-5 TIMES PER WEEK.  Dietary changes: Yes  PT ATE MORE CABBAGE THIS PAST WEEK, WHICH SHE WILL RESUME CONSISTENCY.  Change in health: No  Change in activity: No  Alcohol: No  Other concerns: No    Upcoming Procedures:  Does the Patient Have any upcoming procedures that require interruption in anticoagulation therapy? no  Does the patient require bridging? no      Anticoagulation Summary  As of 3/20/2025      INR goal:  2.0-3.0   TTR:  80.5% (1.5 y)   INR used for dosin.10 (3/20/2025)   Weekly warfarin total:  27.5 mg               Assessment/Plan   Therapeutic     1. New dose: no change    2. Next INR: 1 month      Education provided to patient during the visit:  Patient instructed to call in interim with questions, concerns and changes.   Patient educated on interactions between medications and warfarin.   Patient educated on dietary consistency in vitamin k consumption.   Patient educated on signs of bleeding/clotting.

## 2025-04-09 DIAGNOSIS — E78.2 MIXED HYPERLIPIDEMIA: ICD-10-CM

## 2025-04-09 RX ORDER — EZETIMIBE 10 MG/1
10 TABLET ORAL DAILY
Qty: 90 TABLET | Refills: 3 | Status: SHIPPED | OUTPATIENT
Start: 2025-04-09

## 2025-04-11 ENCOUNTER — ANTICOAGULATION - WARFARIN VISIT (OUTPATIENT)
Dept: CARDIOLOGY | Facility: CLINIC | Age: 80
End: 2025-04-11
Payer: MEDICARE

## 2025-04-11 DIAGNOSIS — I48.91 ATRIAL FIBRILLATION, UNSPECIFIED TYPE (MULTI): Primary | ICD-10-CM

## 2025-04-15 ENCOUNTER — ANTICOAGULATION - WARFARIN VISIT (OUTPATIENT)
Dept: CARDIOLOGY | Facility: CLINIC | Age: 80
End: 2025-04-15
Payer: MEDICARE

## 2025-04-15 DIAGNOSIS — I48.11 LONGSTANDING PERSISTENT ATRIAL FIBRILLATION (MULTI): ICD-10-CM

## 2025-04-15 LAB
POC INR: 2
POC PROTHROMBIN TIME: NORMAL

## 2025-04-15 PROCEDURE — 99211 OFF/OP EST MAY X REQ PHY/QHP: CPT

## 2025-04-15 PROCEDURE — 85610 PROTHROMBIN TIME: CPT | Mod: QW

## 2025-04-15 NOTE — PROGRESS NOTES
Patient identification verified with 2 identifiers.    Location: [facility] - [address]. [place], [place] [address] [phone]     Referring Physician: Dr Mandujano  Enrollment/ Re-enrollment date: Dr Mandujano   INR Goal: 2.0-3.0  INR monitoring is per [facility] protocol.  Anticoagulation Medication: warfarin  Indication: Atrial Fibrillation/Atrial Flutter    Subjective   Bleeding signs/symptoms: No    Bruising: No   Major bleeding event: No  Thrombosis signs/symptoms: No  Thromboembolic event: No  Missed doses: No  Extra doses: No  Medication changes: No  Dietary changes: No  Change in health: Yes  pt will have an eye procedure injection on   Change in activity: No  Alcohol: No  Other concerns: No    Upcoming Procedures:  Does the Patient Have any upcoming procedures that require interruption in anticoagulation therapy? no  Does the patient require bridging? no      Anticoagulation Summary  As of 4/15/2025      INR goal:  2.0-3.0   TTR:  81.4% (1.5 y)   INR used for dosin.00 (4/15/2025)   Weekly warfarin total:  27.5 mg               Assessment/Plan   Therapeutic     1. New dose: no change    2. Next INR:  9 days      Education provided to patient during the visit:  Patient instructed to call in interim with questions, concerns and changes.   Patient educated on interactions between medications and warfarin.   Patient educated on dietary consistency in vitamin k consumption.   Patient educated on affects of alcohol consumption while taking warfarin.   Patient educated on signs of bleeding/clotting.   Patient educated on compliance with dosing, follow up appointments, and prescribed plan of care.

## 2025-04-17 ENCOUNTER — APPOINTMENT (OUTPATIENT)
Dept: CARDIOLOGY | Facility: CLINIC | Age: 80
End: 2025-04-17
Payer: MEDICARE

## 2025-04-24 ENCOUNTER — ANTICOAGULATION - WARFARIN VISIT (OUTPATIENT)
Dept: CARDIOLOGY | Facility: CLINIC | Age: 80
End: 2025-04-24
Payer: MEDICARE

## 2025-04-24 DIAGNOSIS — I48.11 LONGSTANDING PERSISTENT ATRIAL FIBRILLATION (MULTI): Primary | ICD-10-CM

## 2025-04-24 LAB
POC INR: 2.1 (ref 0.9–1.1)
POC PROTHROMBIN TIME: ABNORMAL (ref 9.3–12.5)

## 2025-04-24 PROCEDURE — 99211 OFF/OP EST MAY X REQ PHY/QHP: CPT

## 2025-04-24 PROCEDURE — 85610 PROTHROMBIN TIME: CPT | Mod: QW

## 2025-04-24 NOTE — PROGRESS NOTES
Patient identification verified with 2 identifiers.    Location: [facility] - [address]. [place], [place] [address] [phone]     Referring Physician: DR. CHEN  Enrollment/ Re-enrollment date: 25   INR Goal: 2.0-3.0  INR monitoring is per [facility] protocol.  Anticoagulation Medication: warfarin  Indication: Atrial Fibrillation/Atrial Flutter    Subjective   Bleeding signs/symptoms: Yes  PT HAS SOME BLEEDING IN HER LEFT EYE FROM EYE INJECTION.  SHE WAS EVALUATED A FEW WEEKS AGO AND HAS A BLEED IN HER EYE.  PER PT, OKAY TO BE ON WARFARIN.  EYE MD DOES NOT WANT AREA TO FORM A CLOT.    Bruising: No   Major bleeding event: No  Thrombosis signs/symptoms: No  Thromboembolic event: No  Missed doses: No  Extra doses: No  Medication changes: No  Dietary changes: No  Change in health: No  Change in activity: No  Alcohol: No  Other concerns: No    Upcoming Procedures:  Does the Patient Have any upcoming procedures that require interruption in anticoagulation therapy? no  Does the patient require bridging? no      Anticoagulation Summary  As of 2025      INR goal:  2.0-3.0   TTR:  81.7% (1.6 y)   INR used for dosin.10 (2025)   Weekly warfarin total:  27.5 mg               Assessment/Plan   Therapeutic     1. New dose: no change    2. Next INR: 2 weeks      Education provided to patient during the visit:  Patient instructed to call in interim with questions, concerns and changes.   Patient educated on signs of bleeding/clotting.

## 2025-05-02 ENCOUNTER — APPOINTMENT (OUTPATIENT)
Dept: RADIOLOGY | Facility: HOSPITAL | Age: 80
End: 2025-05-02
Payer: MEDICARE

## 2025-05-02 ENCOUNTER — OFFICE VISIT (OUTPATIENT)
Dept: URGENT CARE | Age: 80
End: 2025-05-02
Payer: MEDICARE

## 2025-05-02 ENCOUNTER — HOSPITAL ENCOUNTER (EMERGENCY)
Facility: HOSPITAL | Age: 80
Discharge: HOME | End: 2025-05-02
Attending: EMERGENCY MEDICINE
Payer: MEDICARE

## 2025-05-02 VITALS
RESPIRATION RATE: 16 BRPM | OXYGEN SATURATION: 96 % | DIASTOLIC BLOOD PRESSURE: 77 MMHG | WEIGHT: 226 LBS | HEART RATE: 74 BPM | BODY MASS INDEX: 37.61 KG/M2 | TEMPERATURE: 98.7 F | SYSTOLIC BLOOD PRESSURE: 135 MMHG

## 2025-05-02 VITALS
DIASTOLIC BLOOD PRESSURE: 100 MMHG | TEMPERATURE: 100.8 F | WEIGHT: 226 LBS | BODY MASS INDEX: 37.61 KG/M2 | RESPIRATION RATE: 18 BRPM | OXYGEN SATURATION: 93 % | SYSTOLIC BLOOD PRESSURE: 150 MMHG | HEART RATE: 79 BPM

## 2025-05-02 DIAGNOSIS — I48.91 ATRIAL FIBRILLATION, UNSPECIFIED TYPE (MULTI): ICD-10-CM

## 2025-05-02 DIAGNOSIS — E11.59 TYPE 2 DIABETES MELLITUS WITH OTHER CIRCULATORY COMPLICATION, WITHOUT LONG-TERM CURRENT USE OF INSULIN: ICD-10-CM

## 2025-05-02 DIAGNOSIS — S09.90XA INJURY OF HEAD, INITIAL ENCOUNTER: ICD-10-CM

## 2025-05-02 DIAGNOSIS — L03.211 CELLULITIS OF FACE: ICD-10-CM

## 2025-05-02 DIAGNOSIS — I10 BENIGN ESSENTIAL HYPERTENSION: ICD-10-CM

## 2025-05-02 DIAGNOSIS — Z79.01 LONG TERM CURRENT USE OF ANTICOAGULANT: ICD-10-CM

## 2025-05-02 DIAGNOSIS — N30.90 CYSTITIS: Primary | ICD-10-CM

## 2025-05-02 DIAGNOSIS — E66.01 MORBID (SEVERE) OBESITY DUE TO EXCESS CALORIES (MULTI): ICD-10-CM
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
## 2025-05-02 LAB
ALBUMIN SERPL BCP-MCNC: 4.1 G/DL (ref 3.4–5)
ALP SERPL-CCNC: 69 U/L (ref 33–136)
ALT SERPL W P-5'-P-CCNC: 12 U/L (ref 7–45)
ANION GAP SERPL CALC-SCNC: 18 MMOL/L (ref 10–20)
APPEARANCE UR: ABNORMAL
APTT PPP: 34 SECONDS (ref 26–36)
AST SERPL W P-5'-P-CCNC: 16 U/L (ref 9–39)
BACTERIA #/AREA URNS AUTO: ABNORMAL /HPF
BASOPHILS # BLD AUTO: 0.02 X10*3/UL (ref 0–0.1)
BASOPHILS NFR BLD AUTO: 0.1 %
BILIRUB SERPL-MCNC: 0.6 MG/DL (ref 0–1.2)
BILIRUB UR STRIP.AUTO-MCNC: NEGATIVE MG/DL
BUN SERPL-MCNC: 27 MG/DL (ref 6–23)
CALCIUM SERPL-MCNC: 9.4 MG/DL (ref 8.6–10.3)
CHLORIDE SERPL-SCNC: 98 MMOL/L (ref 98–107)
CO2 SERPL-SCNC: 23 MMOL/L (ref 21–32)
COLOR UR: YELLOW
CREAT SERPL-MCNC: 1.92 MG/DL (ref 0.5–1.05)
EGFRCR SERPLBLD CKD-EPI 2021: 26 ML/MIN/1.73M*2
EOSINOPHIL # BLD AUTO: 0.02 X10*3/UL (ref 0–0.4)
EOSINOPHIL NFR BLD AUTO: 0.1 %
ERYTHROCYTE [DISTWIDTH] IN BLOOD BY AUTOMATED COUNT: 18.1 % (ref 11.5–14.5)
FLUAV RNA RESP QL NAA+PROBE: NOT DETECTED
FLUBV RNA RESP QL NAA+PROBE: NOT DETECTED
GLUCOSE SERPL-MCNC: 149 MG/DL (ref 74–99)
GLUCOSE UR STRIP.AUTO-MCNC: ABNORMAL MG/DL
HCT VFR BLD AUTO: 40.9 % (ref 36–46)
HGB BLD-MCNC: 12.2 G/DL (ref 12–16)
IMM GRANULOCYTES # BLD AUTO: 0.11 X10*3/UL (ref 0–0.5)
IMM GRANULOCYTES NFR BLD AUTO: 0.7 % (ref 0–0.9)
INR PPP: 1.8 (ref 0.9–1.1)
KETONES UR STRIP.AUTO-MCNC: NEGATIVE MG/DL
LEUKOCYTE ESTERASE UR QL STRIP.AUTO: ABNORMAL
LYMPHOCYTES # BLD AUTO: 0.83 X10*3/UL (ref 0.8–3)
LYMPHOCYTES NFR BLD AUTO: 5.6 %
MCH RBC QN AUTO: 23.7 PG (ref 26–34)
MCHC RBC AUTO-ENTMCNC: 29.8 G/DL (ref 32–36)
MCV RBC AUTO: 80 FL (ref 80–100)
MONOCYTES # BLD AUTO: 0.96 X10*3/UL (ref 0.05–0.8)
MONOCYTES NFR BLD AUTO: 6.5 %
MUCOUS THREADS #/AREA URNS AUTO: ABNORMAL /LPF
NEUTROPHILS # BLD AUTO: 12.9 X10*3/UL (ref 1.6–5.5)
NEUTROPHILS NFR BLD AUTO: 87 %
NITRITE UR QL STRIP.AUTO: NEGATIVE
NRBC BLD-RTO: 0 /100 WBCS (ref 0–0)
PH UR STRIP.AUTO: 5.5 [PH]
PLATELET # BLD AUTO: 273 X10*3/UL (ref 150–450)
POTASSIUM SERPL-SCNC: 4.3 MMOL/L (ref 3.5–5.3)
PROT SERPL-MCNC: 7.2 G/DL (ref 6.4–8.2)
PROT UR STRIP.AUTO-MCNC: ABNORMAL MG/DL
PROTHROMBIN TIME: 19.4 SECONDS (ref 9.8–12.4)
RBC # BLD AUTO: 5.14 X10*6/UL (ref 4–5.2)
RBC # UR STRIP.AUTO: ABNORMAL MG/DL
RBC #/AREA URNS AUTO: ABNORMAL /HPF
RSV RNA RESP QL NAA+PROBE: NOT DETECTED
SARS-COV-2 RNA RESP QL NAA+PROBE: NOT DETECTED
SODIUM SERPL-SCNC: 135 MMOL/L (ref 136–145)
SP GR UR STRIP.AUTO: 1.02
SQUAMOUS #/AREA URNS AUTO: ABNORMAL /HPF
UROBILINOGEN UR STRIP.AUTO-MCNC: NORMAL MG/DL
WBC # BLD AUTO: 14.8 X10*3/UL (ref 4.4–11.3)
WBC #/AREA URNS AUTO: >50 /HPF
YEAST BUDDING #/AREA UR COMP ASSIST: PRESENT /HPF

## 2025-05-02 PROCEDURE — 81001 URINALYSIS AUTO W/SCOPE: CPT

## 2025-05-02 PROCEDURE — 80053 COMPREHEN METABOLIC PANEL: CPT

## 2025-05-02 PROCEDURE — 70450 CT HEAD/BRAIN W/O DYE: CPT | Performed by: STUDENT IN AN ORGANIZED HEALTH CARE EDUCATION/TRAINING PROGRAM

## 2025-05-02 PROCEDURE — 99285 EMERGENCY DEPT VISIT HI MDM: CPT | Mod: 25 | Performed by: EMERGENCY MEDICINE

## 2025-05-02 PROCEDURE — 36415 COLL VENOUS BLD VENIPUNCTURE: CPT

## 2025-05-02 PROCEDURE — 72125 CT NECK SPINE W/O DYE: CPT | Performed by: STUDENT IN AN ORGANIZED HEALTH CARE EDUCATION/TRAINING PROGRAM

## 2025-05-02 PROCEDURE — 70450 CT HEAD/BRAIN W/O DYE: CPT

## 2025-05-02 PROCEDURE — 85730 THROMBOPLASTIN TIME PARTIAL: CPT

## 2025-05-02 PROCEDURE — 71045 X-RAY EXAM CHEST 1 VIEW: CPT

## 2025-05-02 PROCEDURE — 73502 X-RAY EXAM HIP UNI 2-3 VIEWS: CPT | Mod: LT

## 2025-05-02 PROCEDURE — 71045 X-RAY EXAM CHEST 1 VIEW: CPT | Mod: FOREIGN READ | Performed by: RADIOLOGY

## 2025-05-02 PROCEDURE — 72125 CT NECK SPINE W/O DYE: CPT

## 2025-05-02 PROCEDURE — 85025 COMPLETE CBC W/AUTO DIFF WBC: CPT

## 2025-05-02 PROCEDURE — 85610 PROTHROMBIN TIME: CPT

## 2025-05-02 PROCEDURE — 87637 SARSCOV2&INF A&B&RSV AMP PRB: CPT

## 2025-05-02 PROCEDURE — 2500000001 HC RX 250 WO HCPCS SELF ADMINISTERED DRUGS (ALT 637 FOR MEDICARE OP)

## 2025-05-02 PROCEDURE — 87086 URINE CULTURE/COLONY COUNT: CPT | Mod: AHULAB

## 2025-05-02 PROCEDURE — 2500000001 HC RX 250 WO HCPCS SELF ADMINISTERED DRUGS (ALT 637 FOR MEDICARE OP): Performed by: EMERGENCY MEDICINE

## 2025-05-02 PROCEDURE — 73502 X-RAY EXAM HIP UNI 2-3 VIEWS: CPT | Mod: LEFT SIDE | Performed by: RADIOLOGY

## 2025-05-02 RX ORDER — ACETAMINOPHEN 325 MG/1
975 TABLET ORAL ONCE
Status: COMPLETED | OUTPATIENT
Start: 2025-05-02 | End: 2025-05-02

## 2025-05-02 RX ORDER — CEPHALEXIN 500 MG/1
500 CAPSULE ORAL 3 TIMES DAILY
Qty: 21 CAPSULE | Refills: 0 | Status: SHIPPED | OUTPATIENT
Start: 2025-05-02 | End: 2025-05-09

## 2025-05-02 RX ORDER — CEPHALEXIN 500 MG/1
500 CAPSULE ORAL ONCE
Status: COMPLETED | OUTPATIENT
Start: 2025-05-02 | End: 2025-05-02

## 2025-05-02 RX ADMIN — ACETAMINOPHEN 975 MG: 325 TABLET ORAL at 10:57

## 2025-05-02 RX ADMIN — CEPHALEXIN 500 MG: 500 CAPSULE ORAL at 12:59

## 2025-05-02 ASSESSMENT — ENCOUNTER SYMPTOMS
DIZZINESS: 0
CARDIOVASCULAR NEGATIVE: 1
OCCASIONAL FEELINGS OF UNSTEADINESS: 1
WEAKNESS: 1
LIGHT-HEADEDNESS: 0
DEPRESSION: 0
RESPIRATORY NEGATIVE: 1
HEADACHES: 1
CONSTITUTIONAL NEGATIVE: 1
LOSS OF SENSATION IN FEET: 0
PSYCHIATRIC NEGATIVE: 1
EYES NEGATIVE: 1

## 2025-05-02 ASSESSMENT — PAIN - FUNCTIONAL ASSESSMENT
PAIN_FUNCTIONAL_ASSESSMENT: 0-10
PAIN_FUNCTIONAL_ASSESSMENT: 0-10

## 2025-05-02 ASSESSMENT — PAIN SCALES - GENERAL
PAINLEVEL_OUTOF10: 0 - NO PAIN
PAINLEVEL_OUTOF10: 7
PAINLEVEL_OUTOF10: 7

## 2025-05-02 ASSESSMENT — COLUMBIA-SUICIDE SEVERITY RATING SCALE - C-SSRS
6. HAVE YOU EVER DONE ANYTHING, STARTED TO DO ANYTHING, OR PREPARED TO DO ANYTHING TO END YOUR LIFE?: NO
1. IN THE PAST MONTH, HAVE YOU WISHED YOU WERE DEAD OR WISHED YOU COULD GO TO SLEEP AND NOT WAKE UP?: NO
2. HAVE YOU ACTUALLY HAD ANY THOUGHTS OF KILLING YOURSELF?: NO

## 2025-05-02 ASSESSMENT — PAIN DESCRIPTION - DESCRIPTORS: DESCRIPTORS: HEADACHE

## 2025-05-02 NOTE — PATIENT INSTRUCTIONS
Transferred to ER via squad, severe headache after fall on Tuesday.  Takes Coumadin.  Not able to adequately evaluate in Urgent Care setting.  Dr Mariano notified per [facility] Urgent Care policy

## 2025-05-02 NOTE — ED TRIAGE NOTES
Pt. To ED from urgent care for headache, HTN . Patient states she fell on Tuesday. Patient denies hitting her head/face. Patient endorses pain on her left buttocks. Patient states headache started on Tuesday after fall.

## 2025-05-02 NOTE — PROGRESS NOTES
"Subjective   Patient ID: Kyleigh Chappell is a 79 y.o. female. They present today with a chief complaint of Fall (Swollen nose s/p fall/).    History of Present Illness  Reports that she tripped and fell at home on Tuesday.  States she was not paying attention to where she was going, denies loss of consciousness, not clear how she landed, thinks she landed on knees with arms out stretched but today has \"a screamer of a headache,\" and \"doesn't typically get headaches.  Nose is also swollen.  Taking Coumadin for atrial fibrillation.      Trauma  Mechanism of injury: Fall     Current symptoms:       Associated symptoms:             Reports headache.       Past Medical History  Allergies as of 05/02/2025 - Reviewed 05/02/2025   Allergen Reaction Noted    Atorvastatin Other 09/14/2023    Lovastatin Other 09/14/2023    Nitrofurantoin monohyd/m-cryst Unknown 09/14/2023    Sulfamethoxazole-trimethoprim Unknown 09/14/2023       Prescriptions Prior to Admission[1]     Medical History[2]    Surgical History[3]     reports that she has never smoked. She has never been exposed to tobacco smoke. She has never used smokeless tobacco. She reports that she does not drink alcohol and does not use drugs.    Review of Systems  Review of Systems   Constitutional: Negative.    Eyes: Negative.    Respiratory: Negative.     Cardiovascular: Negative.    Neurological:  Positive for weakness (states left leg feels weaker) and headaches. Negative for dizziness and light-headedness.   Psychiatric/Behavioral: Negative.                                    Objective    Vitals:    05/02/25 0824   BP: (!) 150/100   Pulse: 79   Resp: 18   Temp: (!) 38.2 °C (100.8 °F)   TempSrc: Oral   SpO2: 93%   Weight: 103 kg (226 lb)     No LMP recorded. Patient is postmenopausal.    Physical Exam  Constitutional:       Appearance: She is obese.   Cardiovascular:      Rate and Rhythm: Normal rate and regular rhythm.      Pulses: Normal pulses.      Heart sounds: " Normal heart sounds.   Pulmonary:      Effort: Pulmonary effort is normal.      Breath sounds: Normal breath sounds.   Abdominal:      Palpations: Abdomen is soft.   Skin:     General: Skin is warm and dry.   Neurological:      Mental Status: She is alert and oriented to person, place, and time.      Cranial Nerves: No dysarthria or facial asymmetry.      Sensory: Sensation is intact. No sensory deficit.      Motor: Weakness (left leg) present.      Coordination: Coordination normal.      Gait: Gait is intact.         Procedures    Point of Care Test & Imaging Results from this visit  No results found for this visit on 05/02/25.   Imaging  No results found.    Cardiology, Vascular, and Other Imaging  No other imaging results found for the past 2 days      Diagnostic study results (if any) were reviewed by JOSE ARMANDO Acevedo.    Assessment/Plan   Allergies, medications, history, and pertinent labs/EKGs/Imaging reviewed by JOSE ARMANDO Acevedo.     Medical Decision Making  Transferred to ER via squad, severe headache after fall on Tuesday.  Takes Coumadin.  Not able to adequately evaluate in Urgent Care setting.  Dr Mariano notified per [facility] Urgent Care policy    Orders and Diagnoses  Diagnoses and all orders for this visit:  Fall, initial encounter  Injury of head, initial encounter  Long term current use of anticoagulant  Benign essential hypertension  Atrial fibrillation, unspecified type (Multi)  Type 2 diabetes mellitus with other circulatory complication, without long-term current use of insulin  Morbid (severe) obesity due to excess calories (Multi)      Medical Admin Record      Patient disposition: ED    Electronically signed by JOSE ARMANDO Acevedo  9:28 AM           [1] (Not in a hospital admission)   [2]   Past Medical History:  Diagnosis Date    Anxiety     Depression     Diabetes mellitus (Multi)     Essential (primary) hypertension 02/23/2022    Hypertension    GERD (gastroesophageal  reflux disease)     Unspecified atrial fibrillation (Multi) 01/03/2023    Atrial fibrillation   [3]   Past Surgical History:  Procedure Laterality Date    MASTECTOMY  09/11/2015    Breast Surgery Mastectomy    MR HEAD ANGIO WO IV CONTRAST  4/10/2019    MR HEAD ANGIO WO IV CONTRAST 4/10/2019 [facility] EMERGENCY LEGACY    MR NECK ANGIO WO IV CONTRAST  4/10/2019    MR NECK ANGIO WO IV CONTRAST 4/10/2019 [facility]

## 2025-05-02 NOTE — DISCHARGE INSTRUCTIONS
Take keflex three times per day (antibiotic)   Take tylenol for headache   See your doctor early next week if your pain is not improving   Return to the ED if you pain is worse, if you develop a fever or if the redness in your nose gets worse after 48 hours on the antibiotics

## 2025-05-02 NOTE — ED PROVIDER NOTES
History of Present Illness     History provided by: Patient  Limitations to History: None Identified  External Records Reviewed with Brief Summary: Previous ED visits/recent PCP notes for PMH     HPI:  Kyleigh Chappell is a 79 y.o. female with history of anxiety depression diabetes hypertension GERD atrial fibrillation on warfarin (INR on 4/24/2025 of 2.1) who presents for evaluation of headache after a fall from urgent care.  She had had a fall on Tuesday outside at the edge of her patio on grass falling onto her left hip.  She developed a headache and nasal swelling and pain after the fall.  She states she did not hit her head or face.  She did fall onto her left hip.  She took occasional Tylenol.  She did have an episode of emesis on Wednesday.  Otherwise she has been going about her general ADLs.  She is gone shopping when cooking walking around okay.  Does endorse she still having pain in her left hip.  She rates her headache as a 7.5 out of 10 and states that it is frontal in nature.  She states that she never gets headaches that this is very strange for her.  She otherwise in her normal state of health no vision changes chest pain nausea.  No abdominal pain diarrhea neck or back pain.  No diabetes tingling or weakness.    Physical Exam   Triage vitals:  T 37.1 °C (98.7 °F)  HR 92  /82  RR 15  O2 96 % None (Room air)    General: Awake, alert, in no acute distress  Eyes: Gaze conjugate.  No scleral icterus or injection  HENT: Normo-cephalic, atraumatic. No stridor, mild erythema and edema of the nasal bridge, no C-spine step-offs or tenderness  CV: RRR. Radial/PT pulses 2+ bilaterally  Resp: Breathing non-labored, speaking in full sentences.  Clear to auscultation bilaterally  GI: Soft, non-distended, non-tender. No rebound or guarding.  MSK/Extremities: No gross bony deformities. Moving all extremities, no T or L-spine tenderness palpation or step-offs, tenderness to palpation over SI joint and  left buttock  Skin: Warm. Appropriate color, mild erythema of the face, no bruising over the left buttock or elsewhere  Neuro: Alert. Oriented. Face symmetric. Speech is fluent.  Gross strength and sensation intact in b/l UE and LEs  Psych: Appropriate mood and affect      Medical Decision Making & ED Course   Medical Decision Makin y.o. female who presents for evaluation of ongoing headache after a fall 4 days ago.  Patient is on warfarin.  The fall was mechanical have no concern for syncope cardiac fall or acute neurological process leading to the fall.  She is GCS of 15 with no focal deficits.  She is vitally stable with exception of mild hypertension and afebrile here though she had had a fever at the urgent care she first presented to.  She is on warfarin and was therapeutic at her last INR check.  Will obtain labs including coag screen here.  Given that she had a fall on warfarin with ongoing headache and an episode of emesis a few days ago after the fall will obtain CT head and C-spine.  In the setting of left hip pain will obtain x-ray.  Patient was given Tylenol for headache at this point.  While patient could have intracranial hemorrhage leading to her symptoms I also have concern for viral illness.  Will obtain viral swabs.  She is not having any respiratory symptoms.  Could be allergy or sinusitis as well.  Anticipate ongoing if negative workup versus admission if acute intracranial pathology.  Please see ED course for updates on patient's workup and disposition.  ----     Social Determinants of Health which Significantly Impact Care: None identified   Independent Result Review and Interpretation: Results were independently reviewed and interpreted by myself. Please see ED course and [facility] for full interpretation.    Chronic conditions affecting the patient's care: As documented in the [facility]    Care Considerations: As per [facility]    ED Course:  ED Course as of 25 1713   Fri May 02, 2025   1053 XR hip  left with pelvis when performed 2 or 3 views  No acute fracture or malalignment noted [SC]   1053 CT head wo IV contrast  Intracranial hemorrhage, no obvious mass effect.  Cervical spine without fracture or malalignment though chronic degenerative changes noted [SC]   1054 Coagulation Screen(!)  INR just below therapeutic range [SC]   1054 WBC(!): 14.8 [SC]   1137 XR chest 1 view  Cardiomegaly without acute cardiopulmonary process [SC]   1240 Leukocytosis could be from either urinary tract infection or possibly a cellulitis.  She is complaining of a headache which may be from the cellulitis around the nose or could be a sinusitis.  She does not look like she has meningitis.  I am okay with her trialing antibiotics to cover both skin and urine and giving this 48 hours to improve.  She has a primary care physician she can follow-up with on Monday.  She is agreeable with this plan. [JG]      ED Course User Index  [JG] Geraldine Hodgson MD  [SC] Yanelis French DO         Diagnoses as of 05/02/25 1713   Cystitis   Cellulitis of face     Disposition   As a result of the work-up, the patient was discharged home.  she was informed of her diagnosis and instructed to come back with any concerns or worsening of condition.  she and was agreeable to the plan as discussed above.  she was given the opportunity to ask questions.  All of the patient's questions were answered.    Procedures   Procedures    Patient seen and discussed with ED attending physician.    Yanelis French DO  PGY-3 Emergency Medicine     Yanelis French DO  Resident  05/02/25 1713

## 2025-05-03 LAB — BACTERIA UR CULT: NORMAL

## 2025-05-05 ENCOUNTER — OFFICE VISIT (OUTPATIENT)
Dept: PRIMARY CARE | Facility: CLINIC | Age: 80
End: 2025-05-05
Payer: MEDICARE

## 2025-05-05 VITALS
HEART RATE: 71 BPM | BODY MASS INDEX: 36.82 KG/M2 | SYSTOLIC BLOOD PRESSURE: 158 MMHG | OXYGEN SATURATION: 98 % | DIASTOLIC BLOOD PRESSURE: 88 MMHG | WEIGHT: 221 LBS | HEIGHT: 65 IN

## 2025-05-05 DIAGNOSIS — L03.211 CELLULITIS OF FACE: Primary | ICD-10-CM

## 2025-05-05 DIAGNOSIS — S06.0X0D CONCUSSION WITHOUT LOSS OF CONSCIOUSNESS, SUBSEQUENT ENCOUNTER: ICD-10-CM

## 2025-05-05 PROCEDURE — 1123F ACP DISCUSS/DSCN MKR DOCD: CPT | Performed by: INTERNAL MEDICINE

## 2025-05-05 PROCEDURE — 1159F MED LIST DOCD IN RCRD: CPT | Performed by: INTERNAL MEDICINE

## 2025-05-05 PROCEDURE — 99214 OFFICE O/P EST MOD 30 MIN: CPT | Performed by: INTERNAL MEDICINE

## 2025-05-05 PROCEDURE — 1125F AMNT PAIN NOTED PAIN PRSNT: CPT | Performed by: INTERNAL MEDICINE

## 2025-05-05 PROCEDURE — 1157F ADVNC CARE PLAN IN RCRD: CPT | Performed by: INTERNAL MEDICINE

## 2025-05-05 PROCEDURE — 3077F SYST BP >= 140 MM HG: CPT | Performed by: INTERNAL MEDICINE

## 2025-05-05 PROCEDURE — 1036F TOBACCO NON-USER: CPT | Performed by: INTERNAL MEDICINE

## 2025-05-05 PROCEDURE — 3079F DIAST BP 80-89 MM HG: CPT | Performed by: INTERNAL MEDICINE

## 2025-05-05 RX ORDER — DOXYCYCLINE 100 MG/1
100 CAPSULE ORAL DAILY
Qty: 7 CAPSULE | Refills: 0 | Status: SHIPPED | OUTPATIENT
Start: 2025-05-05 | End: 2025-05-12

## 2025-05-05 ASSESSMENT — PATIENT HEALTH QUESTIONNAIRE - PHQ9
2. FEELING DOWN, DEPRESSED OR HOPELESS: NOT AT ALL
SUM OF ALL RESPONSES TO PHQ9 QUESTIONS 1 AND 2: 0
1. LITTLE INTEREST OR PLEASURE IN DOING THINGS: NOT AT ALL

## 2025-05-05 ASSESSMENT — PAIN SCALES - GENERAL: PAINLEVEL_OUTOF10: 3

## 2025-05-05 ASSESSMENT — ENCOUNTER SYMPTOMS
DIZZINESS: 0
SEIZURES: 0
CHILLS: 0
OCCASIONAL FEELINGS OF UNSTEADINESS: 0
FEVER: 0
LOSS OF SENSATION IN FEET: 0
DEPRESSION: 0
HEADACHES: 1

## 2025-05-05 NOTE — PROGRESS NOTES
"Subjective   Patient ID: Kyleigh Chappell is a 79 y.o. female who presents for ER Follow-up.    Fell 7 days ago-outside fell backwards onto butt-bruised but ok otherwise    2 days later-woke with severe headache and persisted to next day-went to  but BP very high and transported by squad to [facility]. Xrays negative on CT and hip xray. Found mild UTI and possible cellulitis---had problem with ants in the house after  drilled hole to outside and was bit by them. 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 Thinks vision improving a little.    Reviewed ER records including notes, xray and lab results --urine culture negative         Review of Systems   Constitutional:  Negative for chills and fever.   Neurological:  Positive for headaches. Negative for dizziness, seizures and syncope.       Objective   /88   Pulse 71   Ht 1.651 m (5' 5\")   Wt 100 kg (221 lb)   SpO2 98%   BMI 36.78 kg/m²     Physical Exam  Constitutional:       General: She is not in acute distress.     Appearance: She is not ill-appearing.   HENT:      Head:      Comments: No temple tenderness     Nose: No congestion or rhinorrhea.      Comments: No sinus tenderness  Skin:     Comments: Forehead and lower front scalp red and warm with coalescing lesions; legs less so   Neurological:      General: No focal deficit present.      Mental Status: She is oriented to person, place, and time.       Assessment/Plan   Assessment & Plan  Cellulitis of face    Orders:    doxycycline (Vibramycin) 100 mg capsule; Take 1 capsule (100 mg) by mouth once daily for 7 days. Take with at least 8 ounces (large glass) of water, do not lie down for 30 minutes after  advised to call if not improving  Concussion without loss of consciousness, subsequent encounter  Appears headache may be concussion--discussed taking time and avoiding screens will heal on it's own   "

## 2025-05-08 ENCOUNTER — APPOINTMENT (OUTPATIENT)
Dept: CARDIOLOGY | Facility: CLINIC | Age: 80
End: 2025-05-08
Payer: MEDICARE

## 2025-05-08 ENCOUNTER — ANTICOAGULATION - WARFARIN VISIT (OUTPATIENT)
Dept: CARDIOLOGY | Facility: CLINIC | Age: 80
End: 2025-05-08
Payer: MEDICARE

## 2025-05-08 DIAGNOSIS — I48.91 ATRIAL FIBRILLATION, UNSPECIFIED TYPE (MULTI): Primary | ICD-10-CM

## 2025-05-08 LAB
POC INR: 2.7 (ref 0.9–1.1)
POC PROTHROMBIN TIME: NORMAL (ref 9.3–12.5)

## 2025-05-08 PROCEDURE — 85610 PROTHROMBIN TIME: CPT | Mod: QW

## 2025-05-08 PROCEDURE — 99211 OFF/OP EST MAY X REQ PHY/QHP: CPT

## 2025-05-08 NOTE — PROGRESS NOTES
Patient identification verified with 2 identifiers.    Location: [facility] - [address] [place], [place] [address] [phone]     Referring Physician: Dr Golden Mandujano  Enrollment/ Re-enrollment date: 2025   INR Goal: 2.0-3.0  INR monitoring is per [facility] protocol.  Anticoagulation Medication: warfarin  Indication: Atrial Fibrillation/Atrial Flutter    Subjective   Bleeding signs/symptoms: No    Bruising: No   Major bleeding event: No  Thrombosis signs/symptoms: No  Thromboembolic event: No  Missed doses: No  Extra doses: No  Medication changes: Yes  Dietary changes: No  Change in health: No  Change in activity: No  Alcohol: No  Other concerns: Yes Patient had a fall  went to emergency on .  Discussed with patient importance of seeking medical attention with any fall and patient verbalized understanding. Patient reports patient was started on Cephalexin  for 7 days for UTI.  Patient reports patient saw primary care  and was started on doxycycline 100mg for 7 days. Patient reports primary care said patient likely had a concussion.  Patient has taken tylenol intermittently over the last week.  Discussed with patient importance of reporting any medications to [facility] and patient verbalized understanding.    Upcoming Procedures:  Does the Patient Have any upcoming procedures that require interruption in anticoagulation therapy? no  Does the patient require bridging? no      Anticoagulation Summary  As of 2025      INR goal:  2.0-3.0   TTR:  81.0% (1.6 y)   INR used for dosin.70 (2025)   Weekly warfarin total:  27.5 mg               Assessment/Plan   Therapeutic     1. New dose: no change    2. Next INR: 1 week      Education provided to patient during the visit:  Patient instructed to call in interim with questions, concerns and changes.   Patient educated on interactions between medications and warfarin.   Patient educated on dietary consistency in  vitamin k consumption.   Patient educated on affects of alcohol consumption while taking warfarin.   Patient educated on signs of bleeding/clotting.   Patient educated on compliance with dosing, follow up appointments, and prescribed plan of care.

## 2025-05-13 ENCOUNTER — APPOINTMENT (OUTPATIENT)
Dept: PRIMARY CARE | Facility: CLINIC | Age: 80
End: 2025-05-13
Payer: MEDICARE

## 2025-05-15 ENCOUNTER — ANTICOAGULATION - WARFARIN VISIT (OUTPATIENT)
Dept: CARDIOLOGY | Facility: CLINIC | Age: 80
End: 2025-05-15
Payer: MEDICARE

## 2025-05-15 DIAGNOSIS — I48.91 ATRIAL FIBRILLATION, UNSPECIFIED TYPE (MULTI): Primary | ICD-10-CM

## 2025-05-15 LAB
POC INR: 3.2 (ref 0.9–1.1)
POC PROTHROMBIN TIME: ABNORMAL (ref 9.3–12.5)

## 2025-05-15 PROCEDURE — 99211 OFF/OP EST MAY X REQ PHY/QHP: CPT

## 2025-05-15 PROCEDURE — 85610 PROTHROMBIN TIME: CPT | Mod: QW

## 2025-05-15 NOTE — PROGRESS NOTES
Patient identification verified with 2 identifiers.    Location: [facility] - [address] [place], [place] [address] [phone]     Referring Physician: DR. CHEN  Enrollment/ Re-enrollment date: 8/7/25   INR Goal: 2.0-3.0  INR monitoring is per [facility] protocol.  Anticoagulation Medication: warfarin  Indication: Atrial Fibrillation/Atrial Flutter    Subjective   Bleeding signs/symptoms: No    Bruising: No   Major bleeding event: No  Thrombosis signs/symptoms: No  Thromboembolic event: No  Missed doses: No  Extra doses: No  Medication changes: No  ATBS FINISHED.  PT TAKING SOME MORE TYLENOL THAN USUAL.  Dietary changes: No  PT ATE A LITTLE LESS GREEN VEGETABLES THAN USUAL.  SHE WILL RESUME CONSISTENCY.  Change in health: No  Change in activity: No  Alcohol: No  Other concerns: No    Upcoming Procedures:  Does the Patient Have any upcoming procedures that require interruption in anticoagulation therapy? no  Does the patient require bridging? no      Anticoagulation Summary  As of 5/15/2025      INR goal:  2.0-3.0   TTR:  80.8% (1.6 y)   INR used for dosing:  3.20 (5/15/2025)   Weekly warfarin total:  27.5 mg               Assessment/Plan   Supratherapeutic     1. New dose: no change    2. Next INR: 1 week      Education provided to patient during the visit:  Patient instructed to call in interim with questions, concerns and changes.   Patient educated on interactions between medications and warfarin.   Patient educated on dietary consistency in vitamin k consumption.   Patient educated on signs of bleeding/clotting.

## 2025-05-22 ENCOUNTER — ANTICOAGULATION - WARFARIN VISIT (OUTPATIENT)
Dept: CARDIOLOGY | Facility: CLINIC | Age: 80
End: 2025-05-22
Payer: MEDICARE

## 2025-05-22 DIAGNOSIS — I48.91 ATRIAL FIBRILLATION, UNSPECIFIED TYPE (MULTI): Primary | ICD-10-CM

## 2025-05-22 LAB
POC INR: 2.7 (ref 0.9–1.1)
POC PROTHROMBIN TIME: ABNORMAL (ref 9.3–12.5)

## 2025-05-22 PROCEDURE — 99211 OFF/OP EST MAY X REQ PHY/QHP: CPT

## 2025-05-22 PROCEDURE — 85610 PROTHROMBIN TIME: CPT | Mod: QW

## 2025-05-22 NOTE — PROGRESS NOTES
Patient identification verified with 2 identifiers.    Location: [facility] - [address]. [place], [place] [address] [phone]     Referring Physician: DR. CHEN  Enrollment/ Re-enrollment date: 25   INR Goal: 2.0-3.0  INR monitoring is per [facility] protocol.  Anticoagulation Medication: warfarin  Indication: Atrial Fibrillation/Atrial Flutter    Subjective   Bleeding signs/symptoms: No    Bruising: No   Major bleeding event: No  Thrombosis signs/symptoms: No  Thromboembolic event: No  Missed doses: No  Extra doses: No  Medication changes: No  Dietary changes: No  PT RESUMED HER NORMAL AMOUNT OF VITAMIN K INTAKE.  Change in health: No  Change in activity: No  Alcohol: No  Other concerns: No    Upcoming Procedures:  Does the Patient Have any upcoming procedures that require interruption in anticoagulation therapy? no  Does the patient require bridging? no      Anticoagulation Summary  As of 2025      INR goal:  2.0-3.0   TTR:  80.5% (1.6 y)   INR used for dosin.70 (2025)   Weekly warfarin total:  27.5 mg               Assessment/Plan   Therapeutic     1. New dose: no change    2. Next INR: 2 weeks      Education provided to patient during the visit:  Patient instructed to call in interim with questions, concerns and changes.   Patient educated on signs of bleeding/clotting.

## 2025-05-28 DIAGNOSIS — F41.8 DEPRESSION WITH ANXIETY: ICD-10-CM

## 2025-05-28 RX ORDER — ALPRAZOLAM 0.25 MG/1
0.25 TABLET ORAL 3 TIMES DAILY PRN
Qty: 90 TABLET | Refills: 1 | Status: SHIPPED | OUTPATIENT
Start: 2025-05-28

## 2025-06-05 ENCOUNTER — ANTICOAGULATION - WARFARIN VISIT (OUTPATIENT)
Dept: CARDIOLOGY | Facility: CLINIC | Age: 80
End: 2025-06-05
Payer: MEDICARE

## 2025-06-05 DIAGNOSIS — I48.91 ATRIAL FIBRILLATION, UNSPECIFIED TYPE (MULTI): Primary | ICD-10-CM

## 2025-06-05 LAB
POC INR: 2.3 (ref 0.9–1.1)
POC PROTHROMBIN TIME: ABNORMAL (ref 9.3–12.5)

## 2025-06-05 PROCEDURE — 99211 OFF/OP EST MAY X REQ PHY/QHP: CPT

## 2025-06-05 PROCEDURE — 85610 PROTHROMBIN TIME: CPT | Mod: QW

## 2025-06-05 NOTE — PROGRESS NOTES
Patient identification verified with 2 identifiers.    Location: [facility] - [address]. [place], [place] [address] [phone]     Referring Physician: DR. CHEN  Enrollment/ Re-enrollment date: 25   INR Goal: 2.0-3.0  INR monitoring is per [facility] protocol.  Anticoagulation Medication: warfarin  Indication: Atrial Fibrillation/Atrial Flutter    Subjective   Bleeding signs/symptoms: No    Bruising: No   Major bleeding event: No  Thrombosis signs/symptoms: No  Thromboembolic event: No  Missed doses: No  Extra doses: No  Medication changes: No  Dietary changes: No  Change in health: No  Change in activity: No  Alcohol: No  Other concerns: No    Upcoming Procedures:  Does the Patient Have any upcoming procedures that require interruption in anticoagulation therapy? no  Does the patient require bridging? no      Anticoagulation Summary  As of 2025      INR goal:  2.0-3.0   TTR:  81.0% (1.7 y)   INR used for dosin.30 (2025)   Weekly warfarin total:  27.5 mg               Assessment/Plan   Therapeutic     1. New dose: no change    2. Next INR: 1 month      Education provided to patient during the visit:  Patient instructed to call in interim with questions, concerns and changes.   Patient educated on signs of bleeding/clotting.

## 2025-06-10 NOTE — PROGRESS NOTES
Name: Bella Mott      : 1997      MRN: [id number]  Encounter Provider: ROXANNE Cool  Encounter Date: 6/10/2025   Encounter department: [facility] [place]  :  Assessment & Plan  Subcutaneous mass of abdominal wall  Patient presents for evaluation of a mass she has had since . This is on the right lateral superior aspect of her c section scar. This will cause more pain and grow larger in size around and during her period. It will then shrink and not cause as much discomfort. I do feel about a soft moveable 2 cm mass on the right lateral portion of c- section scar. I do not feel any hernia at this location. There is no tenderness during palpation at this time.   She had seen her GYN for this multiple occasions. She had a ultrasound which shown a lesion along the c section scar. She also had an MRI which shown an enhancing subcutaneous lesion that could be differential of desmoid tumor, scar endometriosis, or other soft tissue neoplasm. I do believe this could be more towards a scar endometriosis as this mass is more responsive and reactive during the time of her menstrual cycle.   Discussed options with patient of observation vs surgical excision. Reviewed risk and benefits including if this would be neoplasmic of nature when pathology comes back, could involve another procedure to excise any further margins if necessary. Patient would like to think about surgery at this time but also would like to show us how large and hard the mass can get when she is around the time of her period. Will schedule her an appointment with surgeon during the time of her menstrual cycle and will decide at that time as well if she would like surgical excision. All questions asked and answered.         History of Present Illness   HPI  Bella Mott is a 27 y.o. female who presents for a abdominal subcutaneous mass she has had since  at her c section scar. She had a c section in  Patient identification verified with 2 identifiers.    Location: [facility] - [address]. [place], [place] [address] [phone]     Referring Physician: Dr Mandujano  Enrollment/ Re-enrollment date: 2024   INR Goal: 2.0-3.0  INR monitoring is per [facility] protocol.  Anticoagulation Medication: warfarin  Indication: Atrial Fibrillation/Atrial Flutter    Subjective   Bleeding signs/symptoms: No    Bruising: No   Major bleeding event: No  Thrombosis signs/symptoms: No  Thromboembolic event: No  Missed doses: No  Extra doses: No  Medication changes: No  Dietary changes: Yes  Patient has had increased consumption of vitamin K, patient aware effect of vitamin K on warfarin.  Change in health: No  Change in activity: No  Alcohol: No  Other concerns: No    Upcoming Procedures:  Does the Patient Have any upcoming procedures that require interruption in anticoagulation therapy? no  Does the patient require bridging? no      Anticoagulation Summary  As of 2024      INR goal:  2.0-3.0   TTR:  70.3% (6.9 mo)   INR used for dosin.70 (2024)   Weekly warfarin total:  25 mg               Assessment/Plan   Subtherapeutic     1. New dose: no change    2. Next INR: 1 week      Education provided to patient during the visit:  Patient instructed to call in interim with questions, concerns and changes.   Patient educated on interactions between medications and warfarin.   Patient educated on dietary consistency in vitamin k consumption.   Patient educated on affects of alcohol consumption while taking warfarin.   Patient educated on signs of bleeding/clotting.   Patient educated on compliance with dosing, follow up appointments, and prescribed plan of care.          and started to notice a lump at the right lateral portion of the scar. She will notice it gets larger and more painful during her menstrual cycle and then when her menstrual cycle is over, it will get smaller and not cause as much pain. Denies any redness or drainage. Denies fever, chills, N&V. Does not notice it more when coughing or lifting heavy items.       ULTRASOUND ABD WALL 2023:  FINDINGS:  There is a hypoechoic lesion in the right abdominal wall at the margin of the  scar. This measures 7 x 4 x 5 mm. No evidence of hernia.  IMPRESSION:  Hypoechoic lesion along the edge of the  scar. This may represent scar endometriosis and further evaluation with MRI is recommended.    MRI PELVIS FEMALE 05/10/2025:  PELVIC WALL:    section scar noted. Patient's palpable area of concern is located at the lateral aspect of the scar (at the lateral margin of the right rectus abdominis muscle), immediately superior to the scar, where there is a somewhat   poorly-marginated lobulated soft tissue lesion in the deep subcutaneous fat abutting, and appearing to extend into the superficial fascia of, the underlying rectus abdominous muscle. The lesion is T1-isointense to muscle (/), T2-intermediate with   linear areas of low signal (), and shows progressive heterogeneous hyperenhancement (10/205, ). This measures approximately 1.7 x 1.3 x 1.9 cm (series /, ), previously 0.7 x 0.4 x 0.5 cm on ultrasound .     IMPRESSION:  Enhancing subcutaneous lesion measuring up to 1.9 cm at the lateral aspect of the  section scar. The appearance is nonspecific, with differential including desmoid tumor, scar endometriosis, or other soft tissue neoplasm. Consider tissue sampling   as clinically indicated.       Review of Systems   Constitutional:  Negative for chills and fever.   Eyes:  Negative for pain and visual disturbance.   Respiratory:  Negative for cough and shortness of  breath.    Cardiovascular:  Negative for chest pain and palpitations.   Gastrointestinal:  Negative for abdominal pain and vomiting.   Genitourinary:  Negative for dysuria and hematuria.   Musculoskeletal:  Negative for arthralgias and back pain.   Skin:  Negative for color change and rash.   Neurological:  Negative for seizures and syncope.   All other systems reviewed and are negative.         Objective   LMP 2025      Physical Exam  Vitals and nursing note reviewed.   Constitutional:       General: She is not in acute distress.     Appearance: She is well-developed.   HENT:      Head: Normocephalic and atraumatic.     Eyes:      Conjunctiva/sclera: Conjunctivae normal.       Cardiovascular:      Rate and Rhythm: Normal rate and regular rhythm.      Heart sounds: No murmur heard.  Pulmonary:      Effort: Pulmonary effort is normal. No respiratory distress.      Breath sounds: Normal breath sounds.   Abdominal:      Palpations: Abdomen is soft.      Tenderness: There is no abdominal tenderness.      Comments: Soft mobile 2 cm mass on right lateral superior aspect of  scar. No drainage. No erythema or skin discoloration. No fluctuance. Non tender on palpation.    (Red line   scar)     Musculoskeletal:         General: No swelling.      Cervical back: Neck supple.     Skin:     General: Skin is warm and dry.      Capillary Refill: Capillary refill takes less than 2 seconds.     Neurological:      Mental Status: She is alert and oriented to person, place, and time.     Psychiatric:         Mood and Affect: Mood normal.

## 2025-06-16 ENCOUNTER — OFFICE VISIT (OUTPATIENT)
Dept: PRIMARY CARE | Facility: CLINIC | Age: 80
End: 2025-06-16
Payer: MEDICARE

## 2025-06-16 VITALS
BODY MASS INDEX: 36.81 KG/M2 | HEART RATE: 60 BPM | SYSTOLIC BLOOD PRESSURE: 128 MMHG | OXYGEN SATURATION: 98 % | DIASTOLIC BLOOD PRESSURE: 66 MMHG | WEIGHT: 221.2 LBS

## 2025-06-16 DIAGNOSIS — M1A.09X0 IDIOPATHIC CHRONIC GOUT OF MULTIPLE SITES WITHOUT TOPHUS: ICD-10-CM

## 2025-06-16 DIAGNOSIS — Z79.899 HIGH RISK MEDICATION USE: ICD-10-CM

## 2025-06-16 DIAGNOSIS — Z86.73 HISTORY OF RECURRENT TIAS: ICD-10-CM

## 2025-06-16 DIAGNOSIS — N28.9 NEPHROPATHY: ICD-10-CM

## 2025-06-16 DIAGNOSIS — I10 BENIGN ESSENTIAL HYPERTENSION: ICD-10-CM

## 2025-06-16 DIAGNOSIS — I48.91 ATRIAL FIBRILLATION, UNSPECIFIED TYPE (MULTI): ICD-10-CM

## 2025-06-16 DIAGNOSIS — N18.4 CHRONIC RENAL DISEASE, STAGE IV (MULTI): ICD-10-CM

## 2025-06-16 DIAGNOSIS — E11.59 TYPE 2 DIABETES MELLITUS WITH OTHER CIRCULATORY COMPLICATION, WITHOUT LONG-TERM CURRENT USE OF INSULIN: Primary | ICD-10-CM

## 2025-06-16 DIAGNOSIS — F41.8 DEPRESSION WITH ANXIETY: ICD-10-CM

## 2025-06-16 DIAGNOSIS — G47.33 OSA (OBSTRUCTIVE SLEEP APNEA): ICD-10-CM

## 2025-06-16 DIAGNOSIS — E78.2 MIXED HYPERLIPIDEMIA: ICD-10-CM

## 2025-06-16 DIAGNOSIS — K21.9 GASTROESOPHAGEAL REFLUX DISEASE WITHOUT ESOPHAGITIS: ICD-10-CM

## 2025-06-16 LAB — POC HEMOGLOBIN A1C: 7 % (ref 4.2–6.5)

## 2025-06-16 PROCEDURE — 1125F AMNT PAIN NOTED PAIN PRSNT: CPT | Performed by: INTERNAL MEDICINE

## 2025-06-16 PROCEDURE — 1159F MED LIST DOCD IN RCRD: CPT | Performed by: INTERNAL MEDICINE

## 2025-06-16 PROCEDURE — 99214 OFFICE O/P EST MOD 30 MIN: CPT | Performed by: INTERNAL MEDICINE

## 2025-06-16 PROCEDURE — 1036F TOBACCO NON-USER: CPT | Performed by: INTERNAL MEDICINE

## 2025-06-16 PROCEDURE — 83036 HEMOGLOBIN GLYCOSYLATED A1C: CPT | Mod: QW | Performed by: INTERNAL MEDICINE

## 2025-06-16 PROCEDURE — 3074F SYST BP LT 130 MM HG: CPT | Performed by: INTERNAL MEDICINE

## 2025-06-16 PROCEDURE — 3078F DIAST BP <80 MM HG: CPT | Performed by: INTERNAL MEDICINE

## 2025-06-16 PROCEDURE — G2211 COMPLEX E/M VISIT ADD ON: HCPCS | Performed by: INTERNAL MEDICINE

## 2025-06-16 ASSESSMENT — ENCOUNTER SYMPTOMS
DYSURIA: 0
WEAKNESS: 1
COUGH: 0
ABDOMINAL PAIN: 0
ACTIVITY CHANGE: 0
CARDIOVASCULAR NEGATIVE: 1
CONSTIPATION: 0
CONSTITUTIONAL NEGATIVE: 1
SHORTNESS OF BREATH: 0
ARTHRALGIAS: 1
DIARRHEA: 0

## 2025-06-16 ASSESSMENT — PATIENT HEALTH QUESTIONNAIRE - PHQ9
1. LITTLE INTEREST OR PLEASURE IN DOING THINGS: NOT AT ALL
2. FEELING DOWN, DEPRESSED OR HOPELESS: NOT AT ALL
SUM OF ALL RESPONSES TO PHQ9 QUESTIONS 1 AND 2: 0

## 2025-06-16 ASSESSMENT — PAIN SCALES - GENERAL: PAINLEVEL_OUTOF10: 3

## 2025-06-16 NOTE — PROGRESS NOTES
Subjective   Patient ID: Kyleigh Chappell is a 79 y.o. female who presents for 4 month follow up .    Atrial Fibrillation--stable on meds-remains on coumadin-adjusted by cardio. Managed by  Dr Mandujano    S/p aortic valve repair-monitored by Dr Mandujano    NIDDM 2 with retinopathy, nephropathy and stage 4 kidney dz--stable on amaryl, jardiance and metformin.   Sugars at home: 120-150, high if eats too many carbs.  Lab Results       Component                Value               Date                       HGBA1C                   7.3 (A)             09/11/2024                                             6.9                     2/14/2025                                               7.0                     6/16/2025                                                                                          GERD-stable on  protonix    Gout-- On allopurinol. Last flare after lobster  Lab Results       Component                Value               Date                       URICACID                 4.1                 06/19/2024               Depression with anxiety---fairly stable w/ sertraline and averaging 1/2 xanax 2x/day.   Econsent signed 9/11/2024.  [place]AR[place] reviewed 6/16/2025    CRF-26  last eGFR 4/2025; now anemic with low iron and taking oral iron--managed by Dr Lamas    Sleep apnea-uses cpap nightly--feels bad without it    H/o recurrant TIA's-no symptoms since on coumadin    Exercise-- rec center-water exercises, floor exercises       Diet tries to watch carbs and fat-moved to small plates    Dr Lamas-added iron daily for anemia    Diabetic retinopathy with bleeding-had 2 shots-lost reading vision on one eye--first shot has shrunk the area of bleeding           Review of Systems   Constitutional: Negative.  Negative for activity change.        Slowly losing weight   Eyes:  Positive for visual disturbance.   Respiratory:  Negative for cough and shortness of breath.    Cardiovascular: Negative.         Able to  exercise without difficulty   Gastrointestinal:  Negative for abdominal pain, constipation and diarrhea.   Genitourinary:  Negative for dysuria.   Musculoskeletal:  Positive for arthralgias.        Using voltaren gel when has pain   Skin:  Negative for rash.   Neurological:  Positive for weakness (legs already stronger with exercise).   Psychiatric/Behavioral:          Excited-able to have 5 rooms painted last week and her gardens put in       Objective   /66 (BP Location: Left arm, Patient Position: Sitting)   Pulse 60   Wt 100 kg (221 lb 3.2 oz)   SpO2 98%   BMI 36.81 kg/m²     Physical Exam  Constitutional:       General: She is not in acute distress.     Appearance: Normal appearance.   Cardiovascular:      Rate and Rhythm: Normal rate. Rhythm irregular.      Heart sounds: Murmur heard.      No gallop.   Pulmonary:      Breath sounds: Normal breath sounds. No wheezing, rhonchi or rales.   Skin:     General: Skin is warm and dry.      Findings: No rash.   Neurological:      General: No focal deficit present.      Mental Status: She is alert and oriented to person, place, and time.   Psychiatric:         Mood and Affect: Mood normal.      Comments: Excited about butterfly gardern         Assessment/Plan  will continue regimen; check FLP and uric acid  Diagnoses and all orders for this visit:  Type 2 diabetes mellitus with other circulatory complication, without long-term current use of insulin  -     POCT glycosylated hemoglobin (Hb A1C) manually resulted  Atrial fibrillation, unspecified type (Multi)  Benign essential hypertension  Mixed hyperlipidemia  -     Lipid Panel; Future  Gastroesophageal reflux disease without esophagitis  Chronic renal disease, stage IV (Multi)  Nephropathy  Depression with anxiety  Idiopathic chronic gout of multiple sites without tophus  -     Uric Acid; Future  History of recurrent TIAs  TORSTEN (obstructive sleep apnea)  High risk medication use  -     Drug Screen, Urine With  Reflex to Confirmation    Current Medications[1]           [1]   Current Outpatient Medications:     allopurinol (Zyloprim) 300 mg tablet, Take 1 tablet (300 mg) by mouth once daily., Disp: 90 tablet, Rfl: 3    ALPRAZolam (Xanax) 0.25 mg tablet, Take 1 tablet (0.25 mg) by mouth 3 times a day as needed for anxiety., Disp: 90 tablet, Rfl: 1    ascorbic acid (Vitamin C) 500 mg tablet, Take 1 tablet (500 mg) by mouth 2 times a day. With iron tablet, Disp: , Rfl:     aspirin 81 mg chewable tablet, Chew 1 tablet (81 mg) once daily., Disp: , Rfl:     blood sugar diagnostic (OneTouch Verio test strips) strip, use daily as directed, Disp: 100 each, Rfl: 3    cholecalciferol, vitamin D3, (VITAMIN D3 ORAL), Take 1 capsule by mouth 2 times a week., Disp: , Rfl:     colesevelam (Welchol) 625 mg tablet, Take 3 tablets (1,875 mg) by mouth 2 times daily (morning and late afternoon)., Disp: , Rfl:     diclofenac sodium (Voltaren) 1 % gel gel, Apply topically 3 times a day., Disp: , Rfl:     dilTIAZem CD (Cardizem CD) 180 mg 24 hr capsule, TAKE ONE CAPSULE BY MOUTH EVERY DAY, Disp: 90 capsule, Rfl: 3    empagliflozin (Jardiance) 25 mg, take 1 tablet by mouth once daily, Disp: 90 tablet, Rfl: 3    ezetimibe (Zetia) 10 mg tablet, TAKE ONE TABLET BY MOUTH EVERY DAY, Disp: 90 tablet, Rfl: 3    furosemide (Lasix) 20 mg tablet, Take 0.5 tablets (10 mg) by mouth once daily. (Patient taking differently: Take 0.5 tablets (10 mg) by mouth if needed.), Disp: , Rfl:     glimepiride (Amaryl) 2 mg tablet, Take 1 tablet (2 mg) by mouth once daily in the morning. Take before meals., Disp: 90 tablet, Rfl: 3    iron polysaccharides (Nu-Iron,Niferex) 150 mg iron capsule, Take 1 capsule (150 mg) by mouth once daily., Disp: , Rfl:     lancets (OneTouch Delica Plus Lancet) 33 gauge misc, use daily, Disp: 100 each, Rfl: 3    lancets misc, once daily., Disp: , Rfl:     metFORMIN (Glucophage) 500 mg tablet, Take 1 tablet (500 mg) by mouth 2 times daily  (morning and late afternoon)., Disp: 180 tablet, Rfl: 3    metoprolol tartrate (Lopressor) 50 mg tablet, Take 1 tablet by mouth 2 times a day., Disp: 180 tablet, Rfl: 3    pantoprazole (ProtoNix) 40 mg EC tablet, Take 1 tablet (40 mg) by mouth 2 times a day., Disp: 180 tablet, Rfl: 3    sertraline (Zoloft) 50 mg tablet, TAKE ONE TABLET BY MOUTH DAILY, Disp: 90 tablet, Rfl: 3    warfarin (Coumadin) 5 mg tablet, daily, Disp: 90 tablet, Rfl: 3    warfarin (Jantoven) 2.5 mg tablet, Take 1 tablet (2.5 mg) by mouth 3 times a week. 1 tab(s) orally once a day 3 days a week (mon,  weds, friday, ), Disp: , Rfl:

## 2025-06-18 LAB
1OH-MIDAZOLAM UR-MCNC: NEGATIVE NG/ML
7AMINOCLONAZEPAM UR-MCNC: NEGATIVE NG/ML
A-OH ALPRAZ UR-MCNC: 35 NG/ML
A-OH-TRIAZOLAM UR-MCNC: NEGATIVE NG/ML
AMPHETAMINES UR QL: NEGATIVE NG/ML
BARBITURATES UR QL: NEGATIVE NG/ML
BENZODIAZ UR QL: POSITIVE NG/ML
BZE UR QL: NEGATIVE NG/ML
CREAT UR-MCNC: 47.1 MG/DL
DRUG SCREEN COMMENT UR-IMP: ABNORMAL
FENTANYL UR QL SCN: NEGATIVE NG/ML
LORAZEPAM UR-MCNC: NEGATIVE NG/ML
METHADONE UR QL: NEGATIVE NG/ML
NORDIAZEPAM UR-MCNC: NEGATIVE NG/ML
OH-ETHYLFLURAZ UR-MCNC: NEGATIVE NG/ML
OPIATES UR QL: NEGATIVE NG/ML
OXAZEPAM UR-MCNC: NEGATIVE NG/ML
OXIDANTS UR QL: NEGATIVE MCG/ML
OXYCODONE UR QL: NEGATIVE NG/ML
PCP UR QL: NEGATIVE NG/ML
PH UR: 5.2 [PH] (ref 4.5–9)
QUEST NOTES AND COMMENTS: ABNORMAL
TEMAZEPAM UR-MCNC: NEGATIVE NG/ML
THC UR QL: NEGATIVE NG/ML

## 2025-07-01 DIAGNOSIS — E11.9 TYPE 2 DIABETES MELLITUS WITHOUT COMPLICATION, WITHOUT LONG-TERM CURRENT USE OF INSULIN: ICD-10-CM

## 2025-07-01 RX ORDER — LANCETS 33 GAUGE
EACH MISCELLANEOUS
Qty: 100 EACH | Refills: 3 | Status: SHIPPED | OUTPATIENT
Start: 2025-07-01

## 2025-07-03 ENCOUNTER — ANTICOAGULATION - WARFARIN VISIT (OUTPATIENT)
Dept: CARDIOLOGY | Facility: CLINIC | Age: 80
End: 2025-07-03
Payer: MEDICARE

## 2025-07-03 DIAGNOSIS — I48.91 ATRIAL FIBRILLATION, UNSPECIFIED TYPE (MULTI): Primary | ICD-10-CM

## 2025-07-03 LAB
POC INR: 2.3 (ref 0.9–1.1)
POC PROTHROMBIN TIME: ABNORMAL (ref 9.3–12.5)

## 2025-07-03 PROCEDURE — 99211 OFF/OP EST MAY X REQ PHY/QHP: CPT

## 2025-07-03 PROCEDURE — 85610 PROTHROMBIN TIME: CPT | Mod: QW

## 2025-07-03 NOTE — PROGRESS NOTES
Patient identification verified with 2 identifiers.    Location: [facility] - [address]. [place], [place] [address] [phone]     Referring Physician: DR. CHEN   Enrollment/ Re-enrollment date: 25   INR Goal: 2.0-3.0  INR monitoring is per [facility] protocol.  Anticoagulation Medication: warfarin  Indication: Atrial Fibrillation/Atrial Flutter    Subjective   Bleeding signs/symptoms: Yes  LAST WEEK, PT HAD INJECTION IN HER LEFT EYE AND IT CONTINUES WITH BLEEDING IN SCLERA.  SHE STATES THAT EYE MD IS AWARE AND SHE WILL CONTACT HIM IF IT DOESN'T IMPROVE MY NEXT WEEK.    Bruising: No   Major bleeding event: No  Thrombosis signs/symptoms: No  Thromboembolic event: No  Missed doses: No  Extra doses: No  Medication changes: No  Dietary changes: No  Change in health: No  Change in activity: No  Alcohol: No  Other concerns: No    Upcoming Procedures:  Does the Patient Have any upcoming procedures that require interruption in anticoagulation therapy? no  Does the patient require bridging? no      Anticoagulation Summary  As of 7/3/2025      INR goal:  2.0-3.0   TTR:  81.8% (1.8 y)   INR used for dosin.30 (7/3/2025)   Weekly warfarin total:  27.5 mg               Assessment/Plan   Therapeutic     1. New dose: no change    2. Next INR: 1 month      Education provided to patient during the visit:  Patient instructed to call in interim with questions, concerns and changes.   Patient educated on signs of bleeding/clotting.

## 2025-07-12 LAB
CHOLEST SERPL-MCNC: 210 MG/DL
CHOLEST/HDLC SERPL: 5.8 (CALC)
HDLC SERPL-MCNC: 36 MG/DL
LDLC SERPL CALC-MCNC: 127 MG/DL (CALC)
NONHDLC SERPL-MCNC: 174 MG/DL (CALC)
TRIGL SERPL-MCNC: 327 MG/DL
URATE SERPL-MCNC: 4.5 MG/DL (ref 2.5–7)

## 2025-07-15 ENCOUNTER — TELEPHONE (OUTPATIENT)
Dept: PRIMARY CARE | Facility: CLINIC | Age: 80
End: 2025-07-15
Payer: MEDICARE

## 2025-07-15 NOTE — TELEPHONE ENCOUNTER
Patient had been constipated for several days, took miralax, other laxitive medication without relief. She then manually disimpacted her bowel. Patient is having hip pain and her soreness on her anal membrane. Dr. Quiroz advised patient take tylenol for the pain and a hemorrhoid suppository or cream for the soreness.

## 2025-07-31 ENCOUNTER — ANTICOAGULATION - WARFARIN VISIT (OUTPATIENT)
Dept: CARDIOLOGY | Facility: CLINIC | Age: 80
End: 2025-07-31
Payer: MEDICARE

## 2025-07-31 DIAGNOSIS — I48.91 ATRIAL FIBRILLATION, UNSPECIFIED TYPE (MULTI): Primary | ICD-10-CM

## 2025-07-31 LAB
POC INR: 2.8 (ref 0.9–1.1)
POC PROTHROMBIN TIME: ABNORMAL (ref 9.3–12.5)

## 2025-07-31 PROCEDURE — 99211 OFF/OP EST MAY X REQ PHY/QHP: CPT

## 2025-07-31 PROCEDURE — 85610 PROTHROMBIN TIME: CPT | Mod: QW | Performed by: INTERNAL MEDICINE

## 2025-07-31 NOTE — PROGRESS NOTES
Patient identification verified with 2 identifiers.    Location: [facility] - [address]. [place], [place] [address] [phone]     Referring Physician: DR. CHEN  Enrollment/ Re-enrollment date: 7/3/26   INR Goal: 2.0-3.0  INR monitoring is per [facility] protocol.  Anticoagulation Medication: warfarin  Indication: Atrial Fibrillation/Atrial Flutter    Subjective   Bleeding signs/symptoms: No    Bruising: No   Major bleeding event: No  Thrombosis signs/symptoms: No  Thromboembolic event: No  Missed doses: No  Extra doses: No  Medication changes: No  Dietary changes: No  Change in health: No  Change in activity: No  Alcohol: No  Other concerns: No    Upcoming Procedures:  Does the Patient Have any upcoming procedures that require interruption in anticoagulation therapy? no  Does the patient require bridging? no      Anticoagulation Summary  As of 2025      INR goal:  2.0-3.0   TTR:  82.6% (1.8 y)   INR used for dosin.80 (2025)   Weekly warfarin total:  27.5 mg               Assessment/Plan   Therapeutic     1. New dose: no change    2. Next INR: 1 month      Education provided to patient during the visit:  Patient instructed to call in interim with questions, concerns and changes.   Patient educated on signs of bleeding/clotting.

## 2025-08-13 ENCOUNTER — APPOINTMENT (OUTPATIENT)
Dept: CARDIOLOGY | Facility: HOSPITAL | Age: 80
End: 2025-08-13
Payer: MEDICARE

## 2025-08-26 ENCOUNTER — OFFICE VISIT (OUTPATIENT)
Dept: CARDIOLOGY | Facility: HOSPITAL | Age: 80
End: 2025-08-26
Payer: MEDICARE

## 2025-08-26 ENCOUNTER — HOSPITAL ENCOUNTER (OUTPATIENT)
Dept: CARDIOLOGY | Facility: HOSPITAL | Age: 80
Discharge: HOME | End: 2025-08-26
Payer: MEDICARE

## 2025-08-26 VITALS
HEART RATE: 58 BPM | HEIGHT: 65 IN | BODY MASS INDEX: 26.16 KG/M2 | DIASTOLIC BLOOD PRESSURE: 75 MMHG | WEIGHT: 157 LBS | SYSTOLIC BLOOD PRESSURE: 145 MMHG

## 2025-08-26 DIAGNOSIS — Z95.2 S/P TAVR (TRANSCATHETER AORTIC VALVE REPLACEMENT): ICD-10-CM

## 2025-08-26 DIAGNOSIS — I48.91 ATRIAL FIBRILLATION, UNSPECIFIED TYPE (MULTI): Primary | ICD-10-CM

## 2025-08-26 DIAGNOSIS — Z95.3 PRESENCE OF XENOGENIC HEART VALVE: ICD-10-CM

## 2025-08-26 LAB
AORTIC VALVE MEAN GRADIENT: 9 MMHG
AORTIC VALVE PEAK VELOCITY: 2.28 M/S
AV PEAK GRADIENT: 21 MMHG
AVA (PEAK VEL): 1.55 CM2
AVA (VTI): 1.87 CM2
EJECTION FRACTION APICAL 4 CHAMBER: 74.2
EJECTION FRACTION: 73 %
LEFT ATRIUM VOLUME AREA LENGTH INDEX BSA: 36.8 ML/M2
LEFT VENTRICLE INTERNAL DIMENSION DIASTOLE: 4.41 CM (ref 3.5–6)
LEFT VENTRICULAR OUTFLOW TRACT DIAMETER: 2.16 CM
RIGHT VENTRICLE FREE WALL PEAK S': 14 CM/S
TRICUSPID ANNULAR PLANE SYSTOLIC EXCURSION: 2.1 CM

## 2025-08-26 PROCEDURE — 99214 OFFICE O/P EST MOD 30 MIN: CPT | Performed by: INTERNAL MEDICINE

## 2025-08-26 PROCEDURE — 1159F MED LIST DOCD IN RCRD: CPT | Performed by: INTERNAL MEDICINE

## 2025-08-26 PROCEDURE — 93306 TTE W/DOPPLER COMPLETE: CPT

## 2025-08-26 PROCEDURE — 1160F RVW MEDS BY RX/DR IN RCRD: CPT | Performed by: INTERNAL MEDICINE

## 2025-08-26 PROCEDURE — 3078F DIAST BP <80 MM HG: CPT | Performed by: INTERNAL MEDICINE

## 2025-08-26 PROCEDURE — 3077F SYST BP >= 140 MM HG: CPT | Performed by: INTERNAL MEDICINE

## 2025-08-26 PROCEDURE — 93010 ELECTROCARDIOGRAM REPORT: CPT | Performed by: INTERNAL MEDICINE

## 2025-08-26 PROCEDURE — 99212 OFFICE O/P EST SF 10 MIN: CPT | Mod: 25

## 2025-08-26 PROCEDURE — 93005 ELECTROCARDIOGRAM TRACING: CPT | Performed by: INTERNAL MEDICINE

## 2025-08-26 PROCEDURE — 1036F TOBACCO NON-USER: CPT | Performed by: INTERNAL MEDICINE

## 2025-08-26 PROCEDURE — 93306 TTE W/DOPPLER COMPLETE: CPT | Performed by: INTERNAL MEDICINE

## 2025-08-27 LAB
ATRIAL RATE: 53 BPM
Q ONSET: 192 MS
QRS COUNT: 10 BEATS
QRS DURATION: 140 MS
QT INTERVAL: 446 MS
QTC CALCULATION(BAZETT): 437 MS
QTC FREDERICIA: 440 MS
R AXIS: -71 DEGREES
T AXIS: -6 DEGREES
T OFFSET: 415 MS
VENTRICULAR RATE: 58 BPM

## 2025-08-28 ENCOUNTER — ANTICOAGULATION - WARFARIN VISIT (OUTPATIENT)
Dept: CARDIOLOGY | Facility: CLINIC | Age: 80
End: 2025-08-28
Payer: MEDICARE

## 2025-08-28 DIAGNOSIS — I48.91 ATRIAL FIBRILLATION, UNSPECIFIED TYPE (MULTI): Primary | ICD-10-CM

## 2025-08-28 LAB
POC INR: 2.8 (ref 0.9–1.1)
POC PROTHROMBIN TIME: ABNORMAL (ref 9.3–12.5)

## 2025-08-28 PROCEDURE — 85610 PROTHROMBIN TIME: CPT | Mod: QW | Performed by: INTERNAL MEDICINE

## 2025-08-28 PROCEDURE — 99211 OFF/OP EST MAY X REQ PHY/QHP: CPT

## 2025-10-20 ENCOUNTER — APPOINTMENT (OUTPATIENT)
Dept: NEPHROLOGY | Facility: CLINIC | Age: 80
End: 2025-10-20
Payer: MEDICARE